# Patient Record
Sex: FEMALE | Race: WHITE
[De-identification: names, ages, dates, MRNs, and addresses within clinical notes are randomized per-mention and may not be internally consistent; named-entity substitution may affect disease eponyms.]

---

## 2017-01-27 PROBLEM — Z00.00 ENCOUNTER FOR PREVENTIVE HEALTH EXAMINATION: Status: ACTIVE | Noted: 2017-01-27

## 2017-02-07 ENCOUNTER — APPOINTMENT (OUTPATIENT)
Dept: OTOLARYNGOLOGY | Facility: CLINIC | Age: 72
End: 2017-02-07

## 2017-02-07 VITALS
DIASTOLIC BLOOD PRESSURE: 90 MMHG | RESPIRATION RATE: 16 BRPM | HEIGHT: 64 IN | BODY MASS INDEX: 30.39 KG/M2 | HEART RATE: 68 BPM | WEIGHT: 178 LBS | SYSTOLIC BLOOD PRESSURE: 158 MMHG

## 2017-02-07 DIAGNOSIS — Z82.49 FAMILY HISTORY OF ISCHEMIC HEART DISEASE AND OTHER DISEASES OF THE CIRCULATORY SYSTEM: ICD-10-CM

## 2017-02-07 DIAGNOSIS — Z78.9 OTHER SPECIFIED HEALTH STATUS: ICD-10-CM

## 2017-02-07 DIAGNOSIS — K11.20 SIALOADENITIS, UNSPECIFIED: ICD-10-CM

## 2017-02-07 DIAGNOSIS — C73 MALIGNANT NEOPLASM OF THYROID GLAND: ICD-10-CM

## 2017-02-07 DIAGNOSIS — Z87.891 PERSONAL HISTORY OF NICOTINE DEPENDENCE: ICD-10-CM

## 2017-02-27 ENCOUNTER — MESSAGE (OUTPATIENT)
Age: 72
End: 2017-02-27

## 2017-03-13 ENCOUNTER — RESULT REVIEW (OUTPATIENT)
Age: 72
End: 2017-03-13

## 2017-12-18 ENCOUNTER — APPOINTMENT (OUTPATIENT)
Dept: CARDIOLOGY | Facility: CLINIC | Age: 72
End: 2017-12-18
Payer: MEDICARE

## 2017-12-18 PROCEDURE — ZZZZZ: CPT

## 2017-12-20 ENCOUNTER — APPOINTMENT (OUTPATIENT)
Dept: CARDIOLOGY | Facility: CLINIC | Age: 72
End: 2017-12-20
Payer: MEDICARE

## 2017-12-20 PROCEDURE — PHASE4: CUSTOM

## 2017-12-22 ENCOUNTER — APPOINTMENT (OUTPATIENT)
Dept: CARDIOLOGY | Facility: CLINIC | Age: 72
End: 2017-12-22
Payer: MEDICARE

## 2017-12-22 PROCEDURE — ZZZZZ: CPT

## 2017-12-22 PROCEDURE — PHASE4: CUSTOM

## 2017-12-27 ENCOUNTER — APPOINTMENT (OUTPATIENT)
Dept: CARDIOLOGY | Facility: CLINIC | Age: 72
End: 2017-12-27
Payer: MEDICARE

## 2017-12-27 PROCEDURE — PHASE4: CUSTOM

## 2017-12-27 PROCEDURE — ZZZZZ: CPT

## 2017-12-29 ENCOUNTER — APPOINTMENT (OUTPATIENT)
Dept: CARDIOLOGY | Facility: CLINIC | Age: 72
End: 2017-12-29
Payer: MEDICARE

## 2017-12-29 PROCEDURE — ZZZZZ: CPT

## 2017-12-29 PROCEDURE — PHASE4: CUSTOM

## 2018-01-03 ENCOUNTER — APPOINTMENT (OUTPATIENT)
Dept: CARDIOLOGY | Facility: CLINIC | Age: 73
End: 2018-01-03
Payer: MEDICARE

## 2018-01-03 PROCEDURE — PHASE4: CUSTOM

## 2018-01-05 ENCOUNTER — APPOINTMENT (OUTPATIENT)
Dept: CARDIOLOGY | Facility: CLINIC | Age: 73
End: 2018-01-05
Payer: MEDICARE

## 2018-01-05 PROCEDURE — ZZZZZ: CPT

## 2018-01-08 ENCOUNTER — APPOINTMENT (OUTPATIENT)
Dept: CARDIOLOGY | Facility: CLINIC | Age: 73
End: 2018-01-08
Payer: MEDICARE

## 2018-01-08 PROCEDURE — PHASE4: CUSTOM

## 2018-01-08 PROCEDURE — ZZZZZ: CPT

## 2018-01-10 ENCOUNTER — APPOINTMENT (OUTPATIENT)
Dept: CARDIOLOGY | Facility: CLINIC | Age: 73
End: 2018-01-10
Payer: MEDICARE

## 2018-01-10 PROCEDURE — PHASE4: CUSTOM

## 2018-01-10 PROCEDURE — ZZZZZ: CPT

## 2018-01-12 ENCOUNTER — APPOINTMENT (OUTPATIENT)
Dept: CARDIOLOGY | Facility: CLINIC | Age: 73
End: 2018-01-12
Payer: MEDICARE

## 2018-01-12 PROCEDURE — ZZZZZ: CPT

## 2018-01-12 PROCEDURE — PHASE4: CUSTOM

## 2018-01-17 ENCOUNTER — APPOINTMENT (OUTPATIENT)
Dept: CARDIOLOGY | Facility: CLINIC | Age: 73
End: 2018-01-17
Payer: MEDICARE

## 2018-01-17 PROCEDURE — ZZZZZ: CPT

## 2018-01-19 ENCOUNTER — APPOINTMENT (OUTPATIENT)
Dept: CARDIOLOGY | Facility: CLINIC | Age: 73
End: 2018-01-19
Payer: MEDICARE

## 2018-01-19 PROCEDURE — ZZZZZ: CPT

## 2018-01-22 ENCOUNTER — APPOINTMENT (OUTPATIENT)
Dept: CARDIOLOGY | Facility: CLINIC | Age: 73
End: 2018-01-22
Payer: SELF-PAY

## 2018-01-22 PROCEDURE — ZZZZZ: CPT

## 2018-01-24 ENCOUNTER — APPOINTMENT (OUTPATIENT)
Dept: CARDIOLOGY | Facility: CLINIC | Age: 73
End: 2018-01-24
Payer: SELF-PAY

## 2018-01-24 PROCEDURE — ZZZZZ: CPT

## 2018-01-26 ENCOUNTER — APPOINTMENT (OUTPATIENT)
Dept: CARDIOLOGY | Facility: CLINIC | Age: 73
End: 2018-01-26
Payer: SELF-PAY

## 2018-01-26 PROCEDURE — ZZZZZ: CPT

## 2018-01-29 ENCOUNTER — APPOINTMENT (OUTPATIENT)
Dept: CARDIOLOGY | Facility: CLINIC | Age: 73
End: 2018-01-29
Payer: SELF-PAY

## 2018-01-29 PROCEDURE — PHASE4: CUSTOM

## 2018-01-29 PROCEDURE — ZZZZZ: CPT

## 2018-01-31 ENCOUNTER — APPOINTMENT (OUTPATIENT)
Dept: CARDIOLOGY | Facility: CLINIC | Age: 73
End: 2018-01-31

## 2018-02-02 ENCOUNTER — APPOINTMENT (OUTPATIENT)
Dept: CARDIOLOGY | Facility: CLINIC | Age: 73
End: 2018-02-02

## 2018-02-05 ENCOUNTER — APPOINTMENT (OUTPATIENT)
Dept: CARDIOLOGY | Facility: CLINIC | Age: 73
End: 2018-02-05

## 2018-02-07 ENCOUNTER — APPOINTMENT (OUTPATIENT)
Dept: CARDIOLOGY | Facility: CLINIC | Age: 73
End: 2018-02-07

## 2018-02-09 ENCOUNTER — APPOINTMENT (OUTPATIENT)
Dept: CARDIOLOGY | Facility: CLINIC | Age: 73
End: 2018-02-09

## 2018-02-12 ENCOUNTER — APPOINTMENT (OUTPATIENT)
Dept: CARDIOLOGY | Facility: CLINIC | Age: 73
End: 2018-02-12
Payer: SELF-PAY

## 2018-02-12 PROCEDURE — PHASE4: CUSTOM

## 2018-02-14 ENCOUNTER — APPOINTMENT (OUTPATIENT)
Dept: CARDIOLOGY | Facility: CLINIC | Age: 73
End: 2018-02-14
Payer: SELF-PAY

## 2018-02-14 PROCEDURE — PHASE4: CUSTOM

## 2018-02-16 ENCOUNTER — APPOINTMENT (OUTPATIENT)
Dept: CARDIOLOGY | Facility: CLINIC | Age: 73
End: 2018-02-16
Payer: SELF-PAY

## 2018-02-16 PROCEDURE — PHASE4: CUSTOM

## 2018-02-19 ENCOUNTER — APPOINTMENT (OUTPATIENT)
Dept: CARDIOLOGY | Facility: CLINIC | Age: 73
End: 2018-02-19
Payer: SELF-PAY

## 2018-02-19 PROCEDURE — PHASE4: CUSTOM

## 2018-02-21 ENCOUNTER — APPOINTMENT (OUTPATIENT)
Dept: CARDIOLOGY | Facility: CLINIC | Age: 73
End: 2018-02-21
Payer: SELF-PAY

## 2018-02-21 PROCEDURE — PHASE4: CUSTOM

## 2018-02-23 ENCOUNTER — APPOINTMENT (OUTPATIENT)
Dept: CARDIOLOGY | Facility: CLINIC | Age: 73
End: 2018-02-23
Payer: SELF-PAY

## 2018-02-23 PROCEDURE — PHASE4: CUSTOM

## 2018-02-26 ENCOUNTER — APPOINTMENT (OUTPATIENT)
Dept: CARDIOLOGY | Facility: CLINIC | Age: 73
End: 2018-02-26
Payer: SELF-PAY

## 2018-02-26 PROCEDURE — PHASE4: CUSTOM

## 2018-02-28 ENCOUNTER — APPOINTMENT (OUTPATIENT)
Dept: CARDIOLOGY | Facility: CLINIC | Age: 73
End: 2018-02-28
Payer: SELF-PAY

## 2018-02-28 PROCEDURE — PHASE4: CUSTOM

## 2018-03-02 ENCOUNTER — APPOINTMENT (OUTPATIENT)
Dept: CARDIOLOGY | Facility: CLINIC | Age: 73
End: 2018-03-02

## 2018-03-05 ENCOUNTER — APPOINTMENT (OUTPATIENT)
Dept: CARDIOLOGY | Facility: CLINIC | Age: 73
End: 2018-03-05
Payer: SELF-PAY

## 2018-03-05 PROCEDURE — PHASE4: CUSTOM

## 2018-03-07 ENCOUNTER — APPOINTMENT (OUTPATIENT)
Dept: CARDIOLOGY | Facility: CLINIC | Age: 73
End: 2018-03-07
Payer: SELF-PAY

## 2018-03-07 PROCEDURE — PHASE4: CUSTOM

## 2018-03-09 ENCOUNTER — APPOINTMENT (OUTPATIENT)
Dept: CARDIOLOGY | Facility: CLINIC | Age: 73
End: 2018-03-09

## 2018-03-12 ENCOUNTER — APPOINTMENT (OUTPATIENT)
Dept: CARDIOLOGY | Facility: CLINIC | Age: 73
End: 2018-03-12
Payer: SELF-PAY

## 2018-03-12 PROCEDURE — PHASE4: CUSTOM

## 2018-03-14 ENCOUNTER — APPOINTMENT (OUTPATIENT)
Dept: CARDIOLOGY | Facility: CLINIC | Age: 73
End: 2018-03-14
Payer: SELF-PAY

## 2018-03-14 PROCEDURE — PHASE4: CUSTOM

## 2018-03-16 ENCOUNTER — APPOINTMENT (OUTPATIENT)
Dept: CARDIOLOGY | Facility: CLINIC | Age: 73
End: 2018-03-16
Payer: SELF-PAY

## 2018-03-16 PROCEDURE — PHASE4: CUSTOM

## 2018-03-19 ENCOUNTER — APPOINTMENT (OUTPATIENT)
Dept: CARDIOLOGY | Facility: CLINIC | Age: 73
End: 2018-03-19
Payer: SELF-PAY

## 2018-03-19 PROCEDURE — PHASE4: CUSTOM

## 2018-03-21 ENCOUNTER — APPOINTMENT (OUTPATIENT)
Dept: CARDIOLOGY | Facility: CLINIC | Age: 73
End: 2018-03-21

## 2018-03-23 ENCOUNTER — APPOINTMENT (OUTPATIENT)
Dept: CARDIOLOGY | Facility: CLINIC | Age: 73
End: 2018-03-23
Payer: SELF-PAY

## 2018-03-23 PROCEDURE — PHASE4: CUSTOM

## 2018-03-26 ENCOUNTER — APPOINTMENT (OUTPATIENT)
Dept: CARDIOLOGY | Facility: CLINIC | Age: 73
End: 2018-03-26
Payer: SELF-PAY

## 2018-03-26 PROCEDURE — PHASE4: CUSTOM

## 2018-03-28 ENCOUNTER — APPOINTMENT (OUTPATIENT)
Dept: CARDIOLOGY | Facility: CLINIC | Age: 73
End: 2018-03-28
Payer: SELF-PAY

## 2018-03-28 PROCEDURE — PHASE4: CUSTOM

## 2018-03-30 ENCOUNTER — APPOINTMENT (OUTPATIENT)
Dept: CARDIOLOGY | Facility: CLINIC | Age: 73
End: 2018-03-30

## 2018-04-02 ENCOUNTER — APPOINTMENT (OUTPATIENT)
Dept: CARDIOLOGY | Facility: CLINIC | Age: 73
End: 2018-04-02

## 2018-04-04 ENCOUNTER — APPOINTMENT (OUTPATIENT)
Dept: CARDIOLOGY | Facility: CLINIC | Age: 73
End: 2018-04-04
Payer: SELF-PAY

## 2018-04-04 PROCEDURE — PHASE4: CUSTOM

## 2018-04-06 ENCOUNTER — APPOINTMENT (OUTPATIENT)
Dept: CARDIOLOGY | Facility: CLINIC | Age: 73
End: 2018-04-06
Payer: SELF-PAY

## 2018-04-06 PROCEDURE — PHASE4: CUSTOM

## 2018-04-09 ENCOUNTER — APPOINTMENT (OUTPATIENT)
Dept: CARDIOLOGY | Facility: CLINIC | Age: 73
End: 2018-04-09
Payer: SELF-PAY

## 2018-04-09 PROCEDURE — PHASE4: CUSTOM

## 2018-04-11 ENCOUNTER — APPOINTMENT (OUTPATIENT)
Dept: CARDIOLOGY | Facility: CLINIC | Age: 73
End: 2018-04-11
Payer: SELF-PAY

## 2018-04-11 PROCEDURE — PHASE4: CUSTOM

## 2018-04-13 ENCOUNTER — APPOINTMENT (OUTPATIENT)
Dept: CARDIOLOGY | Facility: CLINIC | Age: 73
End: 2018-04-13
Payer: SELF-PAY

## 2018-04-13 PROCEDURE — PHASE4: CUSTOM

## 2018-04-16 ENCOUNTER — APPOINTMENT (OUTPATIENT)
Dept: CARDIOLOGY | Facility: CLINIC | Age: 73
End: 2018-04-16
Payer: SELF-PAY

## 2018-04-16 PROCEDURE — PHASE4: CUSTOM

## 2018-04-18 ENCOUNTER — APPOINTMENT (OUTPATIENT)
Dept: CARDIOLOGY | Facility: CLINIC | Age: 73
End: 2018-04-18
Payer: SELF-PAY

## 2018-04-18 PROCEDURE — PHASE4: CUSTOM

## 2018-04-20 ENCOUNTER — APPOINTMENT (OUTPATIENT)
Dept: CARDIOLOGY | Facility: CLINIC | Age: 73
End: 2018-04-20
Payer: SELF-PAY

## 2018-04-20 PROCEDURE — PHASE4: CUSTOM

## 2018-04-23 ENCOUNTER — APPOINTMENT (OUTPATIENT)
Dept: CARDIOLOGY | Facility: CLINIC | Age: 73
End: 2018-04-23
Payer: SELF-PAY

## 2018-04-23 PROCEDURE — PHASE4: CUSTOM

## 2018-04-25 ENCOUNTER — APPOINTMENT (OUTPATIENT)
Dept: CARDIOLOGY | Facility: CLINIC | Age: 73
End: 2018-04-25

## 2018-04-27 ENCOUNTER — APPOINTMENT (OUTPATIENT)
Dept: CARDIOLOGY | Facility: CLINIC | Age: 73
End: 2018-04-27

## 2018-04-30 ENCOUNTER — APPOINTMENT (OUTPATIENT)
Dept: CARDIOLOGY | Facility: CLINIC | Age: 73
End: 2018-04-30
Payer: SELF-PAY

## 2018-04-30 PROCEDURE — PHASE4: CUSTOM

## 2018-05-02 ENCOUNTER — APPOINTMENT (OUTPATIENT)
Dept: CARDIOLOGY | Facility: CLINIC | Age: 73
End: 2018-05-02
Payer: SELF-PAY

## 2018-05-02 PROCEDURE — PHASE4: CUSTOM

## 2018-05-04 ENCOUNTER — APPOINTMENT (OUTPATIENT)
Dept: CARDIOLOGY | Facility: CLINIC | Age: 73
End: 2018-05-04
Payer: SELF-PAY

## 2018-05-04 PROCEDURE — PHASE4: CUSTOM

## 2018-05-07 ENCOUNTER — APPOINTMENT (OUTPATIENT)
Dept: CARDIOLOGY | Facility: CLINIC | Age: 73
End: 2018-05-07
Payer: SELF-PAY

## 2018-05-07 PROCEDURE — PHASE4: CUSTOM

## 2018-05-09 ENCOUNTER — APPOINTMENT (OUTPATIENT)
Dept: CARDIOLOGY | Facility: CLINIC | Age: 73
End: 2018-05-09

## 2018-05-11 ENCOUNTER — APPOINTMENT (OUTPATIENT)
Dept: CARDIOLOGY | Facility: CLINIC | Age: 73
End: 2018-05-11
Payer: SELF-PAY

## 2018-05-11 PROCEDURE — PHASE4: CUSTOM

## 2018-05-14 ENCOUNTER — APPOINTMENT (OUTPATIENT)
Dept: CARDIOLOGY | Facility: CLINIC | Age: 73
End: 2018-05-14
Payer: SELF-PAY

## 2018-05-14 PROCEDURE — PHASE4: CUSTOM

## 2018-05-16 ENCOUNTER — APPOINTMENT (OUTPATIENT)
Dept: CARDIOLOGY | Facility: CLINIC | Age: 73
End: 2018-05-16
Payer: SELF-PAY

## 2018-05-16 PROCEDURE — PHASE4: CUSTOM

## 2018-05-18 ENCOUNTER — APPOINTMENT (OUTPATIENT)
Dept: CARDIOLOGY | Facility: CLINIC | Age: 73
End: 2018-05-18

## 2018-05-21 ENCOUNTER — APPOINTMENT (OUTPATIENT)
Dept: CARDIOLOGY | Facility: CLINIC | Age: 73
End: 2018-05-21

## 2018-05-23 ENCOUNTER — APPOINTMENT (OUTPATIENT)
Dept: CARDIOLOGY | Facility: CLINIC | Age: 73
End: 2018-05-23
Payer: SELF-PAY

## 2018-05-23 PROCEDURE — PHASE4: CUSTOM

## 2018-05-25 ENCOUNTER — APPOINTMENT (OUTPATIENT)
Dept: CARDIOLOGY | Facility: CLINIC | Age: 73
End: 2018-05-25
Payer: SELF-PAY

## 2018-05-25 PROCEDURE — PHASE4: CUSTOM

## 2018-05-30 ENCOUNTER — APPOINTMENT (OUTPATIENT)
Dept: CARDIOLOGY | Facility: CLINIC | Age: 73
End: 2018-05-30
Payer: SELF-PAY

## 2018-05-30 PROCEDURE — PHASE4: CUSTOM

## 2018-06-01 ENCOUNTER — APPOINTMENT (OUTPATIENT)
Dept: CARDIOLOGY | Facility: CLINIC | Age: 73
End: 2018-06-01
Payer: SELF-PAY

## 2018-06-01 PROCEDURE — PHASE4: CUSTOM

## 2018-06-04 ENCOUNTER — APPOINTMENT (OUTPATIENT)
Dept: CARDIOLOGY | Facility: CLINIC | Age: 73
End: 2018-06-04
Payer: SELF-PAY

## 2018-06-04 PROCEDURE — PHASE4: CUSTOM

## 2018-06-06 ENCOUNTER — APPOINTMENT (OUTPATIENT)
Dept: CARDIOLOGY | Facility: CLINIC | Age: 73
End: 2018-06-06
Payer: SELF-PAY

## 2018-06-06 PROCEDURE — PHASE4: CUSTOM

## 2018-06-08 ENCOUNTER — APPOINTMENT (OUTPATIENT)
Dept: CARDIOLOGY | Facility: CLINIC | Age: 73
End: 2018-06-08
Payer: SELF-PAY

## 2018-06-08 PROCEDURE — PHASE4: CUSTOM

## 2018-06-11 ENCOUNTER — APPOINTMENT (OUTPATIENT)
Dept: CARDIOLOGY | Facility: CLINIC | Age: 73
End: 2018-06-11

## 2018-06-13 ENCOUNTER — APPOINTMENT (OUTPATIENT)
Dept: CARDIOLOGY | Facility: CLINIC | Age: 73
End: 2018-06-13
Payer: SELF-PAY

## 2018-06-13 PROCEDURE — PHASE4: CUSTOM

## 2018-06-15 ENCOUNTER — APPOINTMENT (OUTPATIENT)
Dept: CARDIOLOGY | Facility: CLINIC | Age: 73
End: 2018-06-15
Payer: SELF-PAY

## 2018-06-15 PROCEDURE — PHASE4: CUSTOM

## 2018-06-18 ENCOUNTER — APPOINTMENT (OUTPATIENT)
Dept: CARDIOLOGY | Facility: CLINIC | Age: 73
End: 2018-06-18
Payer: SELF-PAY

## 2018-06-18 PROCEDURE — PHASE4: CUSTOM

## 2018-06-20 ENCOUNTER — APPOINTMENT (OUTPATIENT)
Dept: CARDIOLOGY | Facility: CLINIC | Age: 73
End: 2018-06-20
Payer: SELF-PAY

## 2018-06-20 PROCEDURE — PHASE4: CUSTOM

## 2018-06-22 ENCOUNTER — APPOINTMENT (OUTPATIENT)
Dept: CARDIOLOGY | Facility: CLINIC | Age: 73
End: 2018-06-22
Payer: SELF-PAY

## 2018-06-22 PROCEDURE — PHASE4: CUSTOM

## 2018-06-25 ENCOUNTER — APPOINTMENT (OUTPATIENT)
Dept: CARDIOLOGY | Facility: CLINIC | Age: 73
End: 2018-06-25
Payer: SELF-PAY

## 2018-06-25 PROCEDURE — PHASE4: CUSTOM

## 2018-06-27 ENCOUNTER — APPOINTMENT (OUTPATIENT)
Dept: CARDIOLOGY | Facility: CLINIC | Age: 73
End: 2018-06-27
Payer: SELF-PAY

## 2018-06-27 PROCEDURE — PHASE4: CUSTOM

## 2018-06-29 ENCOUNTER — APPOINTMENT (OUTPATIENT)
Dept: CARDIOLOGY | Facility: CLINIC | Age: 73
End: 2018-06-29
Payer: SELF-PAY

## 2018-06-29 PROCEDURE — PHASE4: CUSTOM

## 2018-07-02 ENCOUNTER — APPOINTMENT (OUTPATIENT)
Dept: CARDIOLOGY | Facility: CLINIC | Age: 73
End: 2018-07-02
Payer: SELF-PAY

## 2018-07-02 PROCEDURE — PHASE4: CUSTOM

## 2018-07-06 ENCOUNTER — APPOINTMENT (OUTPATIENT)
Dept: CARDIOLOGY | Facility: CLINIC | Age: 73
End: 2018-07-06

## 2018-07-09 ENCOUNTER — APPOINTMENT (OUTPATIENT)
Dept: CARDIOLOGY | Facility: CLINIC | Age: 73
End: 2018-07-09
Payer: SELF-PAY

## 2018-07-09 PROCEDURE — PHASE4: CUSTOM

## 2018-07-11 ENCOUNTER — APPOINTMENT (OUTPATIENT)
Dept: CARDIOLOGY | Facility: CLINIC | Age: 73
End: 2018-07-11
Payer: SELF-PAY

## 2018-07-11 PROCEDURE — PHASE4: CUSTOM

## 2018-07-13 ENCOUNTER — APPOINTMENT (OUTPATIENT)
Dept: CARDIOLOGY | Facility: CLINIC | Age: 73
End: 2018-07-13
Payer: SELF-PAY

## 2018-07-13 PROCEDURE — PHASE4: CUSTOM

## 2018-07-16 ENCOUNTER — APPOINTMENT (OUTPATIENT)
Dept: CARDIOLOGY | Facility: CLINIC | Age: 73
End: 2018-07-16
Payer: SELF-PAY

## 2018-07-16 PROCEDURE — PHASE4: CUSTOM

## 2018-07-18 ENCOUNTER — APPOINTMENT (OUTPATIENT)
Dept: CARDIOLOGY | Facility: CLINIC | Age: 73
End: 2018-07-18
Payer: SELF-PAY

## 2018-07-18 PROCEDURE — PHASE4: CUSTOM

## 2018-07-20 ENCOUNTER — APPOINTMENT (OUTPATIENT)
Dept: CARDIOLOGY | Facility: CLINIC | Age: 73
End: 2018-07-20
Payer: SELF-PAY

## 2018-07-20 PROCEDURE — PHASE4: CUSTOM

## 2018-07-23 ENCOUNTER — APPOINTMENT (OUTPATIENT)
Dept: CARDIOLOGY | Facility: CLINIC | Age: 73
End: 2018-07-23

## 2018-07-25 ENCOUNTER — APPOINTMENT (OUTPATIENT)
Dept: CARDIOLOGY | Facility: CLINIC | Age: 73
End: 2018-07-25
Payer: SELF-PAY

## 2018-07-25 PROCEDURE — PHASE4: CUSTOM

## 2018-07-27 ENCOUNTER — APPOINTMENT (OUTPATIENT)
Dept: CARDIOLOGY | Facility: CLINIC | Age: 73
End: 2018-07-27
Payer: SELF-PAY

## 2018-07-27 PROCEDURE — PHASE4: CUSTOM

## 2018-07-30 ENCOUNTER — APPOINTMENT (OUTPATIENT)
Dept: CARDIOLOGY | Facility: CLINIC | Age: 73
End: 2018-07-30

## 2018-08-01 ENCOUNTER — APPOINTMENT (OUTPATIENT)
Dept: CARDIOLOGY | Facility: CLINIC | Age: 73
End: 2018-08-01

## 2018-08-03 ENCOUNTER — APPOINTMENT (OUTPATIENT)
Dept: CARDIOLOGY | Facility: CLINIC | Age: 73
End: 2018-08-03

## 2018-08-06 ENCOUNTER — APPOINTMENT (OUTPATIENT)
Dept: CARDIOLOGY | Facility: CLINIC | Age: 73
End: 2018-08-06

## 2018-08-08 ENCOUNTER — APPOINTMENT (OUTPATIENT)
Dept: CARDIOLOGY | Facility: CLINIC | Age: 73
End: 2018-08-08
Payer: SELF-PAY

## 2018-08-08 PROCEDURE — PHASE4: CUSTOM

## 2018-08-10 ENCOUNTER — APPOINTMENT (OUTPATIENT)
Dept: CARDIOLOGY | Facility: CLINIC | Age: 73
End: 2018-08-10
Payer: SELF-PAY

## 2018-08-10 PROCEDURE — PHASE4: CUSTOM

## 2018-08-13 ENCOUNTER — APPOINTMENT (OUTPATIENT)
Dept: CARDIOLOGY | Facility: CLINIC | Age: 73
End: 2018-08-13

## 2018-08-15 ENCOUNTER — APPOINTMENT (OUTPATIENT)
Dept: CARDIOLOGY | Facility: CLINIC | Age: 73
End: 2018-08-15
Payer: SELF-PAY

## 2018-08-15 PROCEDURE — PHASE4: CUSTOM

## 2018-08-17 ENCOUNTER — APPOINTMENT (OUTPATIENT)
Dept: CARDIOLOGY | Facility: CLINIC | Age: 73
End: 2018-08-17

## 2018-08-20 ENCOUNTER — APPOINTMENT (OUTPATIENT)
Dept: CARDIOLOGY | Facility: CLINIC | Age: 73
End: 2018-08-20

## 2018-08-22 ENCOUNTER — APPOINTMENT (OUTPATIENT)
Dept: CARDIOLOGY | Facility: CLINIC | Age: 73
End: 2018-08-22
Payer: SELF-PAY

## 2018-08-22 PROCEDURE — PHASE4: CUSTOM

## 2018-08-24 ENCOUNTER — APPOINTMENT (OUTPATIENT)
Dept: CARDIOLOGY | Facility: CLINIC | Age: 73
End: 2018-08-24
Payer: SELF-PAY

## 2018-08-24 PROCEDURE — PHASE4: CUSTOM

## 2018-08-27 ENCOUNTER — APPOINTMENT (OUTPATIENT)
Dept: CARDIOLOGY | Facility: CLINIC | Age: 73
End: 2018-08-27

## 2018-08-29 ENCOUNTER — APPOINTMENT (OUTPATIENT)
Dept: CARDIOLOGY | Facility: CLINIC | Age: 73
End: 2018-08-29
Payer: SELF-PAY

## 2018-08-29 PROCEDURE — PHASE4: CUSTOM

## 2018-08-31 ENCOUNTER — APPOINTMENT (OUTPATIENT)
Dept: CARDIOLOGY | Facility: CLINIC | Age: 73
End: 2018-08-31

## 2018-09-05 ENCOUNTER — APPOINTMENT (OUTPATIENT)
Dept: CARDIOLOGY | Facility: CLINIC | Age: 73
End: 2018-09-05

## 2018-09-07 ENCOUNTER — APPOINTMENT (OUTPATIENT)
Dept: CARDIOLOGY | Facility: CLINIC | Age: 73
End: 2018-09-07
Payer: SELF-PAY

## 2018-09-07 PROCEDURE — PHASE4: CUSTOM

## 2018-09-10 ENCOUNTER — APPOINTMENT (OUTPATIENT)
Dept: CARDIOLOGY | Facility: CLINIC | Age: 73
End: 2018-09-10

## 2018-09-12 ENCOUNTER — APPOINTMENT (OUTPATIENT)
Dept: CARDIOLOGY | Facility: CLINIC | Age: 73
End: 2018-09-12
Payer: SELF-PAY

## 2018-09-12 PROCEDURE — PHASE4: CUSTOM

## 2018-09-14 ENCOUNTER — APPOINTMENT (OUTPATIENT)
Dept: CARDIOLOGY | Facility: CLINIC | Age: 73
End: 2018-09-14
Payer: SELF-PAY

## 2018-09-14 PROCEDURE — PHASE4: CUSTOM

## 2018-09-17 ENCOUNTER — APPOINTMENT (OUTPATIENT)
Dept: CARDIOLOGY | Facility: CLINIC | Age: 73
End: 2018-09-17
Payer: SELF-PAY

## 2018-09-17 PROCEDURE — PHASE4: CUSTOM

## 2018-09-19 ENCOUNTER — APPOINTMENT (OUTPATIENT)
Dept: CARDIOLOGY | Facility: CLINIC | Age: 73
End: 2018-09-19

## 2018-09-21 ENCOUNTER — APPOINTMENT (OUTPATIENT)
Dept: CARDIOLOGY | Facility: CLINIC | Age: 73
End: 2018-09-21
Payer: SELF-PAY

## 2018-09-21 PROCEDURE — PHASE4: CUSTOM

## 2018-09-24 ENCOUNTER — APPOINTMENT (OUTPATIENT)
Dept: CARDIOLOGY | Facility: CLINIC | Age: 73
End: 2018-09-24

## 2018-09-26 ENCOUNTER — APPOINTMENT (OUTPATIENT)
Dept: CARDIOLOGY | Facility: CLINIC | Age: 73
End: 2018-09-26
Payer: SELF-PAY

## 2018-09-26 PROCEDURE — PHASE4: CUSTOM

## 2018-09-28 ENCOUNTER — APPOINTMENT (OUTPATIENT)
Dept: CARDIOLOGY | Facility: CLINIC | Age: 73
End: 2018-09-28

## 2018-10-01 ENCOUNTER — APPOINTMENT (OUTPATIENT)
Dept: CARDIOLOGY | Facility: CLINIC | Age: 73
End: 2018-10-01
Payer: SELF-PAY

## 2018-10-01 PROCEDURE — PHASE4: CUSTOM

## 2018-10-03 ENCOUNTER — APPOINTMENT (OUTPATIENT)
Dept: CARDIOLOGY | Facility: CLINIC | Age: 73
End: 2018-10-03
Payer: SELF-PAY

## 2018-10-03 PROCEDURE — PHASE4: CUSTOM

## 2018-10-05 ENCOUNTER — APPOINTMENT (OUTPATIENT)
Dept: CARDIOLOGY | Facility: CLINIC | Age: 73
End: 2018-10-05
Payer: SELF-PAY

## 2018-10-05 PROCEDURE — PHASE4: CUSTOM

## 2018-10-08 ENCOUNTER — APPOINTMENT (OUTPATIENT)
Dept: CARDIOLOGY | Facility: CLINIC | Age: 73
End: 2018-10-08

## 2018-10-10 ENCOUNTER — APPOINTMENT (OUTPATIENT)
Dept: CARDIOLOGY | Facility: CLINIC | Age: 73
End: 2018-10-10
Payer: SELF-PAY

## 2018-10-10 PROCEDURE — PHASE4: CUSTOM

## 2018-10-12 ENCOUNTER — APPOINTMENT (OUTPATIENT)
Dept: CARDIOLOGY | Facility: CLINIC | Age: 73
End: 2018-10-12
Payer: SELF-PAY

## 2018-10-12 PROCEDURE — PHASE4: CUSTOM

## 2018-10-15 ENCOUNTER — APPOINTMENT (OUTPATIENT)
Dept: CARDIOLOGY | Facility: CLINIC | Age: 73
End: 2018-10-15

## 2018-10-17 ENCOUNTER — APPOINTMENT (OUTPATIENT)
Dept: CARDIOLOGY | Facility: CLINIC | Age: 73
End: 2018-10-17
Payer: SELF-PAY

## 2018-10-17 PROCEDURE — PHASE4: CUSTOM

## 2018-10-19 ENCOUNTER — APPOINTMENT (OUTPATIENT)
Dept: CARDIOLOGY | Facility: CLINIC | Age: 73
End: 2018-10-19
Payer: SELF-PAY

## 2018-10-19 PROCEDURE — PHASE4: CUSTOM

## 2018-10-22 ENCOUNTER — APPOINTMENT (OUTPATIENT)
Dept: CARDIOLOGY | Facility: CLINIC | Age: 73
End: 2018-10-22
Payer: SELF-PAY

## 2018-10-22 PROCEDURE — PHASE4: CUSTOM

## 2018-10-24 ENCOUNTER — APPOINTMENT (OUTPATIENT)
Dept: CARDIOLOGY | Facility: CLINIC | Age: 73
End: 2018-10-24

## 2018-10-26 ENCOUNTER — APPOINTMENT (OUTPATIENT)
Dept: CARDIOLOGY | Facility: CLINIC | Age: 73
End: 2018-10-26
Payer: SELF-PAY

## 2018-10-26 PROCEDURE — PHASE4: CUSTOM

## 2018-10-29 ENCOUNTER — APPOINTMENT (OUTPATIENT)
Dept: CARDIOLOGY | Facility: CLINIC | Age: 73
End: 2018-10-29

## 2018-10-31 ENCOUNTER — APPOINTMENT (OUTPATIENT)
Dept: CARDIOLOGY | Facility: CLINIC | Age: 73
End: 2018-10-31
Payer: SELF-PAY

## 2018-10-31 PROCEDURE — PHASE4: CUSTOM

## 2018-11-02 ENCOUNTER — APPOINTMENT (OUTPATIENT)
Dept: CARDIOLOGY | Facility: CLINIC | Age: 73
End: 2018-11-02

## 2018-11-05 ENCOUNTER — APPOINTMENT (OUTPATIENT)
Dept: CARDIOLOGY | Facility: CLINIC | Age: 73
End: 2018-11-05
Payer: SELF-PAY

## 2018-11-05 PROCEDURE — PHASE4: CUSTOM

## 2018-11-07 ENCOUNTER — APPOINTMENT (OUTPATIENT)
Dept: CARDIOLOGY | Facility: CLINIC | Age: 73
End: 2018-11-07
Payer: SELF-PAY

## 2018-11-07 PROCEDURE — PHASE4: CUSTOM

## 2018-11-09 ENCOUNTER — APPOINTMENT (OUTPATIENT)
Dept: CARDIOLOGY | Facility: CLINIC | Age: 73
End: 2018-11-09
Payer: SELF-PAY

## 2018-11-09 PROCEDURE — PHASE4: CUSTOM

## 2018-11-12 ENCOUNTER — APPOINTMENT (OUTPATIENT)
Dept: CARDIOLOGY | Facility: CLINIC | Age: 73
End: 2018-11-12
Payer: SELF-PAY

## 2018-11-12 PROCEDURE — PHASE4: CUSTOM

## 2018-11-14 ENCOUNTER — APPOINTMENT (OUTPATIENT)
Dept: CARDIOLOGY | Facility: CLINIC | Age: 73
End: 2018-11-14
Payer: SELF-PAY

## 2018-11-14 PROCEDURE — PHASE4: CUSTOM

## 2018-11-16 ENCOUNTER — APPOINTMENT (OUTPATIENT)
Dept: CARDIOLOGY | Facility: CLINIC | Age: 73
End: 2018-11-16
Payer: SELF-PAY

## 2018-11-16 PROCEDURE — PHASE4: CUSTOM

## 2018-11-19 ENCOUNTER — APPOINTMENT (OUTPATIENT)
Dept: CARDIOLOGY | Facility: CLINIC | Age: 73
End: 2018-11-19
Payer: SELF-PAY

## 2018-11-19 PROCEDURE — PHASE4: CUSTOM

## 2018-11-21 ENCOUNTER — APPOINTMENT (OUTPATIENT)
Dept: CARDIOLOGY | Facility: CLINIC | Age: 73
End: 2018-11-21
Payer: SELF-PAY

## 2018-11-21 PROCEDURE — PHASE4: CUSTOM

## 2018-11-26 ENCOUNTER — APPOINTMENT (OUTPATIENT)
Dept: CARDIOLOGY | Facility: CLINIC | Age: 73
End: 2018-11-26
Payer: SELF-PAY

## 2018-11-26 PROCEDURE — PHASE4: CUSTOM

## 2018-11-28 ENCOUNTER — APPOINTMENT (OUTPATIENT)
Dept: CARDIOLOGY | Facility: CLINIC | Age: 73
End: 2018-11-28
Payer: SELF-PAY

## 2018-11-28 PROCEDURE — PHASE4: CUSTOM

## 2018-11-30 ENCOUNTER — APPOINTMENT (OUTPATIENT)
Dept: CARDIOLOGY | Facility: CLINIC | Age: 73
End: 2018-11-30
Payer: SELF-PAY

## 2018-11-30 PROCEDURE — PHASE4: CUSTOM

## 2018-12-03 ENCOUNTER — APPOINTMENT (OUTPATIENT)
Dept: CARDIOLOGY | Facility: CLINIC | Age: 73
End: 2018-12-03
Payer: SELF-PAY

## 2018-12-03 PROCEDURE — PHASE4: CUSTOM

## 2018-12-05 ENCOUNTER — APPOINTMENT (OUTPATIENT)
Dept: CARDIOLOGY | Facility: CLINIC | Age: 73
End: 2018-12-05
Payer: SELF-PAY

## 2018-12-05 PROCEDURE — PHASE4: CUSTOM

## 2018-12-07 ENCOUNTER — APPOINTMENT (OUTPATIENT)
Dept: CARDIOLOGY | Facility: CLINIC | Age: 73
End: 2018-12-07
Payer: SELF-PAY

## 2018-12-07 PROCEDURE — PHASE4: CUSTOM

## 2018-12-10 ENCOUNTER — APPOINTMENT (OUTPATIENT)
Dept: CARDIOLOGY | Facility: CLINIC | Age: 73
End: 2018-12-10

## 2018-12-12 ENCOUNTER — APPOINTMENT (OUTPATIENT)
Dept: CARDIOLOGY | Facility: CLINIC | Age: 73
End: 2018-12-12
Payer: SELF-PAY

## 2018-12-12 PROCEDURE — PHASE4: CUSTOM

## 2018-12-14 ENCOUNTER — APPOINTMENT (OUTPATIENT)
Dept: CARDIOLOGY | Facility: CLINIC | Age: 73
End: 2018-12-14
Payer: SELF-PAY

## 2018-12-14 PROCEDURE — PHASE4: CUSTOM

## 2018-12-17 ENCOUNTER — APPOINTMENT (OUTPATIENT)
Dept: CARDIOLOGY | Facility: CLINIC | Age: 73
End: 2018-12-17

## 2018-12-19 ENCOUNTER — APPOINTMENT (OUTPATIENT)
Dept: CARDIOLOGY | Facility: CLINIC | Age: 73
End: 2018-12-19
Payer: SELF-PAY

## 2018-12-19 PROCEDURE — PHASE4: CUSTOM

## 2018-12-21 ENCOUNTER — APPOINTMENT (OUTPATIENT)
Dept: CARDIOLOGY | Facility: CLINIC | Age: 73
End: 2018-12-21
Payer: SELF-PAY

## 2018-12-21 PROCEDURE — PHASE4: CUSTOM

## 2018-12-24 ENCOUNTER — APPOINTMENT (OUTPATIENT)
Dept: CARDIOLOGY | Facility: CLINIC | Age: 73
End: 2018-12-24

## 2018-12-28 ENCOUNTER — APPOINTMENT (OUTPATIENT)
Dept: CARDIOLOGY | Facility: CLINIC | Age: 73
End: 2018-12-28
Payer: SELF-PAY

## 2018-12-28 PROCEDURE — PHASE4: CUSTOM

## 2018-12-31 ENCOUNTER — APPOINTMENT (OUTPATIENT)
Dept: CARDIOLOGY | Facility: CLINIC | Age: 73
End: 2018-12-31
Payer: SELF-PAY

## 2018-12-31 PROCEDURE — PHASE4: CUSTOM

## 2019-01-02 ENCOUNTER — APPOINTMENT (OUTPATIENT)
Dept: CARDIOLOGY | Facility: CLINIC | Age: 74
End: 2019-01-02

## 2019-01-04 ENCOUNTER — APPOINTMENT (OUTPATIENT)
Dept: CARDIOLOGY | Facility: CLINIC | Age: 74
End: 2019-01-04

## 2019-01-04 ENCOUNTER — TRANSCRIPTION ENCOUNTER (OUTPATIENT)
Age: 74
End: 2019-01-04

## 2019-01-07 ENCOUNTER — APPOINTMENT (OUTPATIENT)
Dept: CARDIOLOGY | Facility: CLINIC | Age: 74
End: 2019-01-07
Payer: SELF-PAY

## 2019-01-07 PROCEDURE — PHASE4: CUSTOM

## 2019-01-09 ENCOUNTER — APPOINTMENT (OUTPATIENT)
Dept: CARDIOLOGY | Facility: CLINIC | Age: 74
End: 2019-01-09
Payer: SELF-PAY

## 2019-01-09 PROCEDURE — PHASE4: CUSTOM

## 2019-01-11 ENCOUNTER — APPOINTMENT (OUTPATIENT)
Dept: CARDIOLOGY | Facility: CLINIC | Age: 74
End: 2019-01-11
Payer: SELF-PAY

## 2019-01-11 PROCEDURE — PHASE4: CUSTOM

## 2019-01-14 ENCOUNTER — APPOINTMENT (OUTPATIENT)
Dept: CARDIOLOGY | Facility: CLINIC | Age: 74
End: 2019-01-14

## 2019-01-16 ENCOUNTER — APPOINTMENT (OUTPATIENT)
Dept: CARDIOLOGY | Facility: CLINIC | Age: 74
End: 2019-01-16
Payer: SELF-PAY

## 2019-01-16 PROCEDURE — PHASE4: CUSTOM

## 2019-01-18 ENCOUNTER — APPOINTMENT (OUTPATIENT)
Dept: CARDIOLOGY | Facility: CLINIC | Age: 74
End: 2019-01-18

## 2019-01-21 ENCOUNTER — APPOINTMENT (OUTPATIENT)
Dept: CARDIOLOGY | Facility: CLINIC | Age: 74
End: 2019-01-21
Payer: SELF-PAY

## 2019-01-21 PROCEDURE — PHASE4: CUSTOM

## 2019-01-23 ENCOUNTER — APPOINTMENT (OUTPATIENT)
Dept: CARDIOLOGY | Facility: CLINIC | Age: 74
End: 2019-01-23
Payer: SELF-PAY

## 2019-01-23 PROCEDURE — PHASE4: CUSTOM

## 2019-01-25 ENCOUNTER — APPOINTMENT (OUTPATIENT)
Dept: CARDIOLOGY | Facility: CLINIC | Age: 74
End: 2019-01-25

## 2019-01-28 ENCOUNTER — APPOINTMENT (OUTPATIENT)
Dept: CARDIOLOGY | Facility: CLINIC | Age: 74
End: 2019-01-28

## 2019-01-30 ENCOUNTER — APPOINTMENT (OUTPATIENT)
Dept: CARDIOLOGY | Facility: CLINIC | Age: 74
End: 2019-01-30

## 2019-02-01 ENCOUNTER — APPOINTMENT (OUTPATIENT)
Dept: CARDIOLOGY | Facility: CLINIC | Age: 74
End: 2019-02-01

## 2019-02-04 ENCOUNTER — APPOINTMENT (OUTPATIENT)
Dept: CARDIOLOGY | Facility: CLINIC | Age: 74
End: 2019-02-04

## 2019-02-06 ENCOUNTER — APPOINTMENT (OUTPATIENT)
Dept: CARDIOLOGY | Facility: CLINIC | Age: 74
End: 2019-02-06

## 2019-02-08 ENCOUNTER — APPOINTMENT (OUTPATIENT)
Dept: CARDIOLOGY | Facility: CLINIC | Age: 74
End: 2019-02-08

## 2019-02-11 ENCOUNTER — APPOINTMENT (OUTPATIENT)
Dept: CARDIOLOGY | Facility: CLINIC | Age: 74
End: 2019-02-11

## 2019-02-13 ENCOUNTER — APPOINTMENT (OUTPATIENT)
Dept: CARDIOLOGY | Facility: CLINIC | Age: 74
End: 2019-02-13

## 2019-02-15 ENCOUNTER — APPOINTMENT (OUTPATIENT)
Dept: CARDIOLOGY | Facility: CLINIC | Age: 74
End: 2019-02-15

## 2019-02-18 ENCOUNTER — APPOINTMENT (OUTPATIENT)
Dept: CARDIOLOGY | Facility: CLINIC | Age: 74
End: 2019-02-18

## 2019-02-20 ENCOUNTER — APPOINTMENT (OUTPATIENT)
Dept: CARDIOLOGY | Facility: CLINIC | Age: 74
End: 2019-02-20

## 2019-02-22 ENCOUNTER — APPOINTMENT (OUTPATIENT)
Dept: CARDIOLOGY | Facility: CLINIC | Age: 74
End: 2019-02-22

## 2019-02-25 ENCOUNTER — APPOINTMENT (OUTPATIENT)
Dept: CARDIOLOGY | Facility: CLINIC | Age: 74
End: 2019-02-25

## 2019-02-27 ENCOUNTER — APPOINTMENT (OUTPATIENT)
Dept: CARDIOLOGY | Facility: CLINIC | Age: 74
End: 2019-02-27

## 2022-12-22 ENCOUNTER — OFFICE (OUTPATIENT)
Dept: URBAN - METROPOLITAN AREA CLINIC 104 | Facility: CLINIC | Age: 77
Setting detail: OPHTHALMOLOGY
End: 2022-12-22
Payer: MEDICARE

## 2022-12-22 DIAGNOSIS — H26.493: ICD-10-CM

## 2022-12-22 DIAGNOSIS — H43.813: ICD-10-CM

## 2022-12-22 DIAGNOSIS — H35.3132: ICD-10-CM

## 2022-12-22 PROCEDURE — 92014 COMPRE OPH EXAM EST PT 1/>: CPT | Performed by: SPECIALIST

## 2022-12-22 PROCEDURE — 92134 CPTRZ OPH DX IMG PST SGM RTA: CPT | Performed by: SPECIALIST

## 2022-12-22 ASSESSMENT — TONOMETRY
OS_IOP_MMHG: 16
OD_IOP_MMHG: 16

## 2022-12-22 ASSESSMENT — REFRACTION_MANIFEST
OS_ADD: +2.75
OU_VA: 20/20
OS_CYLINDER: -0.25
OS_VA1: 20/25
OS_SPHERE: PL
OD_ADD: +2.75
OD_AXIS: 140
OD_SPHERE: -0.25
OD_CYLINDER: -0.25
OS_AXIS: 115
OD_VA1: 20/20

## 2022-12-22 ASSESSMENT — AXIALLENGTH_DERIVED
OD_AL: 24.3102
OD_AL: 24.3102

## 2022-12-22 ASSESSMENT — CONFRONTATIONAL VISUAL FIELD TEST (CVF)
OS_FINDINGS: FULL
OD_FINDINGS: FULL

## 2022-12-22 ASSESSMENT — VISUAL ACUITY
OS_BCVA: 20/30
OD_BCVA: 20/25-2

## 2022-12-22 ASSESSMENT — REFRACTION_CURRENTRX
OD_OVR_VA: 20/
OS_OVR_VA: 20/

## 2022-12-22 ASSESSMENT — REFRACTION_AUTOREFRACTION
OS_CYLINDER: -0.25
OD_CYLINDER: -0.25
OS_AXIS: 115
OS_SPHERE: PL
OD_AXIS: 138
OD_SPHERE: -0.25

## 2022-12-22 ASSESSMENT — KERATOMETRY
OS_K1POWER_DIOPTERS: 1.87
OS_AXISANGLE_DEGREES: 66
OS_K2POWER_DIOPTERS: 42.19
OD_K2POWER_DIOPTERS: 42.13
OD_AXISANGLE_DEGREES: 654
OD_K1POWER_DIOPTERS: 41.87

## 2022-12-22 ASSESSMENT — SPHEQUIV_DERIVED
OD_SPHEQUIV: -0.375
OD_SPHEQUIV: -0.375

## 2023-07-31 DIAGNOSIS — I77.9 DISORDER OF ARTERIES AND ARTERIOLES, UNSPECIFIED: ICD-10-CM

## 2023-07-31 DIAGNOSIS — C50.919 MALIGNANT NEOPLASM OF UNSPECIFIED SITE OF UNSPECIFIED FEMALE BREAST: ICD-10-CM

## 2023-07-31 DIAGNOSIS — C73 MALIGNANT NEOPLASM OF THYROID GLAND: ICD-10-CM

## 2023-07-31 DIAGNOSIS — Z86.79 PERSONAL HISTORY OF OTHER DISEASES OF THE CIRCULATORY SYSTEM: ICD-10-CM

## 2023-07-31 DIAGNOSIS — Z82.49 FAMILY HISTORY OF ISCHEMIC HEART DISEASE AND OTHER DISEASES OF THE CIRCULATORY SYSTEM: ICD-10-CM

## 2023-07-31 DIAGNOSIS — E66.9 OBESITY, UNSPECIFIED: ICD-10-CM

## 2023-07-31 DIAGNOSIS — Z78.9 OTHER SPECIFIED HEALTH STATUS: ICD-10-CM

## 2023-07-31 DIAGNOSIS — Z92.89 PERSONAL HISTORY OF OTHER MEDICAL TREATMENT: ICD-10-CM

## 2023-10-02 ENCOUNTER — TRANSCRIPTION ENCOUNTER (OUTPATIENT)
Age: 78
End: 2023-10-02

## 2023-10-18 ENCOUNTER — APPOINTMENT (OUTPATIENT)
Dept: CARDIOLOGY | Facility: CLINIC | Age: 78
End: 2023-10-18
Payer: MEDICARE

## 2023-10-18 VITALS
HEART RATE: 57 BPM | SYSTOLIC BLOOD PRESSURE: 132 MMHG | OXYGEN SATURATION: 99 % | HEIGHT: 64 IN | BODY MASS INDEX: 31.58 KG/M2 | WEIGHT: 185 LBS | DIASTOLIC BLOOD PRESSURE: 62 MMHG

## 2023-10-18 DIAGNOSIS — I10 ESSENTIAL (PRIMARY) HYPERTENSION: ICD-10-CM

## 2023-10-18 DIAGNOSIS — I65.29 OCCLUSION AND STENOSIS OF UNSPECIFIED CAROTID ARTERY: ICD-10-CM

## 2023-10-18 DIAGNOSIS — R01.1 CARDIAC MURMUR, UNSPECIFIED: ICD-10-CM

## 2023-10-18 PROCEDURE — 99214 OFFICE O/P EST MOD 30 MIN: CPT | Mod: 25

## 2023-10-18 PROCEDURE — 93000 ELECTROCARDIOGRAM COMPLETE: CPT

## 2023-10-18 RX ORDER — B-COMPLEX WITH VITAMIN C
TABLET ORAL DAILY
Refills: 0 | Status: ACTIVE | COMMUNITY

## 2023-10-18 RX ORDER — PSYLLIUM HUSK 0.4 G
CAPSULE ORAL
Refills: 0 | Status: DISCONTINUED | COMMUNITY
End: 2023-10-18

## 2023-10-18 RX ORDER — LEVOTHYROXINE SODIUM 88 UG/1
88 TABLET ORAL DAILY
Refills: 0 | Status: ACTIVE | COMMUNITY

## 2023-10-18 RX ORDER — ESCITALOPRAM OXALATE 5 MG/1
5 TABLET, FILM COATED ORAL DAILY
Refills: 0 | Status: ACTIVE | COMMUNITY

## 2023-10-18 RX ORDER — LEVOTHYROXINE SODIUM 125 UG/1
125 TABLET ORAL
Refills: 0 | Status: DISCONTINUED | COMMUNITY
End: 2023-10-18

## 2023-10-18 RX ORDER — CALCIUM CITRATE/VITAMIN D3 200MG-6.25
TABLET ORAL TWICE DAILY
Refills: 0 | Status: ACTIVE | COMMUNITY

## 2023-10-18 RX ORDER — MULTIVIT-MIN/FA/LYCOPEN/LUTEIN .4-300-25
TABLET ORAL DAILY
Refills: 0 | Status: ACTIVE | COMMUNITY

## 2023-10-18 RX ORDER — ENALAPRIL MALEATE 20 MG/1
20 TABLET ORAL
Refills: 0 | Status: DISCONTINUED | COMMUNITY
End: 2023-10-18

## 2023-10-18 RX ORDER — MEMANTINE HYDROCHLORIDE 10 MG/1
10 TABLET ORAL TWICE DAILY
Refills: 0 | Status: ACTIVE | COMMUNITY

## 2023-11-06 RX ORDER — ENALAPRIL MALEATE 5 MG/1
TABLET ORAL TWICE DAILY
Refills: 0 | Status: DISCONTINUED | COMMUNITY
End: 2023-11-06

## 2023-12-20 ENCOUNTER — APPOINTMENT (OUTPATIENT)
Dept: CARDIOLOGY | Facility: CLINIC | Age: 78
End: 2023-12-20
Payer: MEDICARE

## 2023-12-20 PROCEDURE — 93306 TTE W/DOPPLER COMPLETE: CPT

## 2023-12-20 PROCEDURE — 93880 EXTRACRANIAL BILAT STUDY: CPT

## 2023-12-20 NOTE — HISTORY OF PRESENT ILLNESS
[FreeTextEntry1] : The patient is a 78-year-old white female with a past medical history remarkable for hypertension, hypercholesterolemia, carotid artery disease, TIA, family history of premature coronary artery disease, and an abnormal ECG The patient has been exercising regularly.  She is chest pain and dyspnea free.  Her cholesterol from 10/28/2022 was acceptable.  I have requested more recent laboratories

## 2023-12-20 NOTE — DISCUSSION/SUMMARY
[FreeTextEntry1] : 1 abnormal ECG An echocardiogram was ordered to rule out a cardiomyopathy or valvular abnormality as the etiology of her abnormal ECG and murmur. 2 carotid artery disease A carotid ultrasound was ordered to rule out progression of her carotid artery disease. 3 hypertension Well-controlled.  Continue medical therapy 4 hypercholesterolemia More recent laboratory results requested 5 bradycardia We will continue to monitor for symptomatic bradycardia   TTM

## 2023-12-20 NOTE — CARDIOLOGY SUMMARY
[de-identified] : Sinus bradycardia, left axis deviation, left anterior fascicular block, poor R wave progression, no significant change from 4/10/2023 [de-identified] : Active Problems - Abnormal ECG - Nuclear stress testing not authorized 12/22/20, EST: 1/5/21, nondiagnostic, the patient exercised 1 min. and 47 seconds reaching only 75% of her MPHR, PHARMACOLOGIC NUCLEAR STRESS TEST: 1/26/21, normal, EF >65% - ECHOCARDIOGRAM: 12/20/2023, EF >65%, LAE, no stenotic or regurgitant lesions  (12/21/2021, outside office, EF 65-70%, trace to mild mitral regurgitation, trace tricuspid regurgitation RVSP 21) - Breast Cancer: lumpectomy/RT - CAROTID ULTRASOUND: 12/20/2023,  RECA <50%  (1/20/2022, RECA >50%, LECA <50%) - Hypertension: Secondary workup 12/15 unremarkable, MRA ABDOMEN: 12/14/15, 50% left renal artery stenosis - Obesity - Hypercholesterolemia - TIA - Family History of premature CAD: Father MI 51, sister MI 57 - Sick Sinus Syndrome: Bradycardia improved off of aricept. HOLTER: 3/10 SR 62, EVENT MONITOR: 8/14, 16 symptoms, 14 =SR, one an APC, one a couplet, NSR 66 (), one asymptomatic 5 beat run of SVT - Thyroid Cancer: s/p Total thyroidectomy ECG:Sinus bradycardia, poor R-wave progression, T-wave inversion lead L, no significant change from 10/11/2022

## 2023-12-27 ENCOUNTER — APPOINTMENT (OUTPATIENT)
Dept: CARDIOLOGY | Facility: CLINIC | Age: 78
End: 2023-12-27
Payer: MEDICARE

## 2023-12-27 DIAGNOSIS — I50.43 ACUTE ON CHRONIC COMBINED SYSTOLIC (CONGESTIVE) AND DIASTOLIC (CONGESTIVE) HEART FAILURE: ICD-10-CM

## 2023-12-27 PROCEDURE — 99441: CPT

## 2023-12-27 NOTE — CARDIOLOGY SUMMARY
[de-identified] : Active Problems - Abnormal ECG - Nuclear stress testing not authorized 12/22/20, EST: 1/5/21, nondiagnostic, the patient exercised 1 min. and 47 seconds reaching only 75% of her MPHR, PHARMACOLOGIC NUCLEAR STRESS TEST: 1/26/21, normal, EF >65% - ECHOCARDIOGRAM: 12/20/2023, EF >65%, LAE, no stenotic or regurgitant lesions   - Breast Cancer: lumpectomy/RT - CAROTID ULTRASOUND: 12/20/2023,  RECA <50%   - Hypertension: Secondary workup 12/15 unremarkable, MRA ABDOMEN: 12/14/15, 50% left renal artery stenosis - Obesity - Hypercholesterolemia - TIA - Family History of premature CAD: Father MI 51, sister MI 57 - Sick Sinus Syndrome: Bradycardia improved off of aricept. HOLTER: 3/10 SR 62, EVENT MONITOR: 8/14, 16 symptoms, 14 =SR, one an APC, one a couplet, NSR 66 (), one asymptomatic 5 beat run of SVT - Thyroid Cancer: s/p Total thyroidectomy ECG:Sinus bradycardia, poor R-wave progression, T-wave inversion lead L, no significant change from 10/11/2022

## 2023-12-27 NOTE — DISCUSSION/SUMMARY
[FreeTextEntry1] : 1 abnormal ECG Normal LV function by echocardiography  Normal nuclear stress test 1/26/2021  2 carotid artery disease Nonobstructive disease.  Continue medical therapy  3 hypertension Well-controlled.  Continue medical therapy 4 hypercholesterolemia At target 9/26/2023 5 bradycardia We will continue to monitor for symptomatic bradycardia  RTO 6 months

## 2023-12-27 NOTE — HISTORY OF PRESENT ILLNESS
[FreeTextEntry1] : The patient is a 78-year-old white female with a past medical history remarkable for hypertension, hypercholesterolemia, carotid artery disease, TIA, family history of premature coronary artery disease, and an abnormal ECG Echocardiography demonstrated a normal ejection fraction and the absence of valve disease.  A carotid ultrasound demonstrated nonobstructive disease.The patient's cholesterol from 9/26/2023 was reviewed.  It is at target. The patient has been exercising regularly.  She is chest pain and dyspnea free.   I reviewed the results with the patient.  A plan was developed.  Her questions were answered.  We spoke on the phone for 5 minutes.  She consented to a telephone telemedicine visit

## 2024-01-02 ENCOUNTER — RX RENEWAL (OUTPATIENT)
Age: 79
End: 2024-01-02

## 2024-01-02 RX ORDER — ATORVASTATIN CALCIUM 40 MG/1
40 TABLET, FILM COATED ORAL
Qty: 90 | Refills: 3 | Status: ACTIVE | COMMUNITY
Start: 2024-01-02 | End: 1900-01-01

## 2024-06-24 ENCOUNTER — RX RENEWAL (OUTPATIENT)
Age: 79
End: 2024-06-24

## 2024-06-27 ENCOUNTER — APPOINTMENT (OUTPATIENT)
Dept: CARDIOLOGY | Facility: CLINIC | Age: 79
End: 2024-06-27
Payer: MEDICARE

## 2024-06-27 VITALS
HEIGHT: 64 IN | SYSTOLIC BLOOD PRESSURE: 150 MMHG | WEIGHT: 189 LBS | BODY MASS INDEX: 32.27 KG/M2 | HEART RATE: 58 BPM | OXYGEN SATURATION: 99 % | DIASTOLIC BLOOD PRESSURE: 72 MMHG

## 2024-06-27 VITALS — DIASTOLIC BLOOD PRESSURE: 68 MMHG | SYSTOLIC BLOOD PRESSURE: 128 MMHG

## 2024-06-27 DIAGNOSIS — G45.9 TRANSIENT CEREBRAL ISCHEMIC ATTACK, UNSPECIFIED: ICD-10-CM

## 2024-06-27 DIAGNOSIS — I10 ESSENTIAL (PRIMARY) HYPERTENSION: ICD-10-CM

## 2024-06-27 DIAGNOSIS — E78.5 HYPERLIPIDEMIA, UNSPECIFIED: ICD-10-CM

## 2024-06-27 DIAGNOSIS — R94.31 ABNORMAL ELECTROCARDIOGRAM [ECG] [EKG]: ICD-10-CM

## 2024-06-27 PROCEDURE — 93000 ELECTROCARDIOGRAM COMPLETE: CPT

## 2024-06-27 PROCEDURE — 99214 OFFICE O/P EST MOD 30 MIN: CPT | Mod: 25

## 2024-07-19 ENCOUNTER — APPOINTMENT (OUTPATIENT)
Dept: ORTHOPEDIC SURGERY | Facility: CLINIC | Age: 79
End: 2024-07-19
Payer: MEDICARE

## 2024-07-19 VITALS — BODY MASS INDEX: 32.27 KG/M2 | WEIGHT: 189 LBS | HEIGHT: 64 IN

## 2024-07-19 DIAGNOSIS — M16.11 UNILATERAL PRIMARY OSTEOARTHRITIS, RIGHT HIP: ICD-10-CM

## 2024-07-19 DIAGNOSIS — Z85.3 PERSONAL HISTORY OF MALIGNANT NEOPLASM OF BREAST: ICD-10-CM

## 2024-07-19 DIAGNOSIS — M41.9 SCOLIOSIS, UNSPECIFIED: ICD-10-CM

## 2024-07-19 DIAGNOSIS — M25.551 PAIN IN RIGHT HIP: ICD-10-CM

## 2024-07-19 DIAGNOSIS — M47.816 SPONDYLOSIS W/OUT MYELOPATHY OR RADICULOPATHY, LUMBAR REGION: ICD-10-CM

## 2024-07-19 PROCEDURE — 73503 X-RAY EXAM HIP UNI 4/> VIEWS: CPT | Mod: RT

## 2024-07-19 PROCEDURE — 99204 OFFICE O/P NEW MOD 45 MIN: CPT

## 2024-07-19 PROCEDURE — 72100 X-RAY EXAM L-S SPINE 2/3 VWS: CPT

## 2024-07-19 RX ORDER — MELOXICAM 15 MG/1
15 TABLET ORAL DAILY
Qty: 30 | Refills: 1 | Status: ACTIVE | COMMUNITY
Start: 2024-07-19 | End: 1900-01-01

## 2024-08-20 ENCOUNTER — APPOINTMENT (OUTPATIENT)
Dept: ORTHOPEDIC SURGERY | Facility: CLINIC | Age: 79
End: 2024-08-20
Payer: MEDICARE

## 2024-08-20 VITALS — WEIGHT: 189 LBS | BODY MASS INDEX: 32.27 KG/M2 | HEIGHT: 64 IN

## 2024-08-20 DIAGNOSIS — M47.816 SPONDYLOSIS W/OUT MYELOPATHY OR RADICULOPATHY, LUMBAR REGION: ICD-10-CM

## 2024-08-20 DIAGNOSIS — M17.12 UNILATERAL PRIMARY OSTEOARTHRITIS, LEFT KNEE: ICD-10-CM

## 2024-08-20 DIAGNOSIS — M25.562 PAIN IN LEFT KNEE: ICD-10-CM

## 2024-08-20 DIAGNOSIS — M16.11 UNILATERAL PRIMARY OSTEOARTHRITIS, RIGHT HIP: ICD-10-CM

## 2024-08-20 DIAGNOSIS — M41.9 SCOLIOSIS, UNSPECIFIED: ICD-10-CM

## 2024-08-20 PROCEDURE — 99214 OFFICE O/P EST MOD 30 MIN: CPT | Mod: 25

## 2024-08-20 PROCEDURE — 73562 X-RAY EXAM OF KNEE 3: CPT | Mod: LT

## 2024-08-20 PROCEDURE — 20611 DRAIN/INJ JOINT/BURSA W/US: CPT | Mod: LT

## 2024-08-20 PROCEDURE — J3490M: CUSTOM

## 2024-08-20 RX ORDER — DICLOFENAC SODIUM AND MISOPROSTOL 75; 200 MG/1; UG/1
75-0.2 TABLET, DELAYED RELEASE ORAL
Qty: 60 | Refills: 0 | Status: ACTIVE | COMMUNITY
Start: 2024-08-20 | End: 1900-01-01

## 2024-08-20 NOTE — DISCUSSION/SUMMARY
[de-identified] : The patient was advised of the diagnosis.  The natural history of the pathology was explained in full to the patient in layman's terms. All questions were answered.  The risks and benefits of surgical and non-surgical treatment alternatives were explained in full to the patient.  The natural progression of Osteoarthritis was explained to the patient.  We discussed the possible treatment options from conservative to operative.  These included NSAIDS, Glucosamine and Chondrotin sulfate, and Physical Therapy as well different types of injections.  We also discussed that at some point they may progress to needed a SAAD.  Information and pamphlets were given when approprate.  The risks, benefits, contents and alternatives to injection were explained in full to the patient.  Risks outlined include but are not limited to infection, sepsis, bleeding, scarring, skin discoloration, temporary increase in pain, syncopal episode, failure to resolve symptoms, allergic reaction, flare reaction, permanent white skin discoloration, symptom recurrence, and elevation of blood sugar in diabetics.  Patient understood the risks.  All questions were answered.  After discussion of options, patient requested an injection.  Oral informed consent was obtained and sterile prep was done of the injection site.  Sterile technique was used to introduce the mixture.  Patient tolerated the procedure well.  Patient advised to ice the injection site this evening.  Signs and symptoms of infection reviewed and patient advised to call immediately for redness, fevers, and/or chills.  Entered by Aleksandra Bustillos acting as a scribe.

## 2024-08-20 NOTE — PROCEDURE
[FreeTextEntry3] : Large joint injection was performed on the _LEFT_ knee. The indication for this procedure was pain, inflammation, and x-ray evidence of Osteoarthritis on this or prior visit. The site was prepped with betadine, ethyl chloride sprayed topically, and sterile technique used. An injection of Lidocaine 3cc of 1%, Bupivacaine (Marcaine) 5cc of 0.25%, Methylprednisolone (Depomedrol) cc of 80 mg was used. Patient was advised to call if redness, pain, or fever occur, apply ice for 15 minutes out of every hour for the next 12-24 hours as tolerated and patient was advised to rest the joint(s) for days. Patient has tried OTC's including aspirin, Ibuprofen, Aleve, etc. or prescription NSAIDS, and/or exercises at home and/or physical therapy without satisfactory response and patient had decreased mobility in the joint. Ultrasound guidance was indicated for this patient due to better visualize joint space. All ultrasound images have been permanently captured and stored accordingly in our picture.   Aspiration of the knee was performed using an 18-gauge needle under sterile conditions. ------------cc of ---------------fluid was aspirated (this was sent for cell count, culture, gram stain, and crystals) No

## 2024-08-20 NOTE — ASSESSMENT
[FreeTextEntry1] : 7/19/24: Pt with mod/adv RT hip OA and Left SAAD done 8 years ago at [facility] ,also with lumbar ddd. Her pain seems to be combination of her hip and spine disease. Would like her to try a course of PT and I am recommending the use of Meloxicam 15mg daily and discussed the risks and benefits of the medication, patient elects to proceed with this. Discussed possible IA hip csi to eval origin of pain hip vs spine but will proceed conservatively for now with antiinflammatory and PT. F/up 1 month  8/20/24: Mod/adv RT hip OA and adv LT knee OA. Meloxicam caused GI upset- will try arthrotec instead. Also will proceed with LT knee csi today- sheryl well. Cont with PT as tolerated. F/up 6 weeks

## 2024-08-20 NOTE — DISCUSSION/SUMMARY
[de-identified] : The patient was advised of the diagnosis.  The natural history of the pathology was explained in full to the patient in layman's terms. All questions were answered.  The risks and benefits of surgical and non-surgical treatment alternatives were explained in full to the patient.  The natural progression of Osteoarthritis was explained to the patient.  We discussed the possible treatment options from conservative to operative.  These included NSAIDS, Glucosamine and Chondrotin sulfate, and Physical Therapy as well different types of injections.  We also discussed that at some point they may progress to needed a SAAD.  Information and pamphlets were given when approprate.  The risks, benefits, contents and alternatives to injection were explained in full to the patient.  Risks outlined include but are not limited to infection, sepsis, bleeding, scarring, skin discoloration, temporary increase in pain, syncopal episode, failure to resolve symptoms, allergic reaction, flare reaction, permanent white skin discoloration, symptom recurrence, and elevation of blood sugar in diabetics.  Patient understood the risks.  All questions were answered.  After discussion of options, patient requested an injection.  Oral informed consent was obtained and sterile prep was done of the injection site.  Sterile technique was used to introduce the mixture.  Patient tolerated the procedure well.  Patient advised to ice the injection site this evening.  Signs and symptoms of infection reviewed and patient advised to call immediately for redness, fevers, and/or chills.  Entered by Aleksandra Bustillos acting as a scribe.

## 2024-08-20 NOTE — HISTORY OF PRESENT ILLNESS
[de-identified] : 8/20/24: Pt with mod/adv RT hip OA and lumbar DDD. Pt states she tried Meloxicam for about 3 days and it upset her GI so she has since discontinued but feels PT has been beneficial. Also reports worsening LT knee pain- no formal tx thus far. Most pain in the knee posteriorly. Pain with sit to stand and getting in and out of the car.   7/19/24: 79F here today for RT hip pain x 1 year worsening in the past 3 months. No specific injury/trauma. Feels she is limping, walks very slowly. Most pain laterally and in the buttocks, occ groin pain. No lower back pain. Taking tylenol with mild benefit. Occ advil. Ambulates without assistance.   Hx of L SAAD at [facility] 8 years ago [FreeTextEntry5] : no pain in right hip. now complain about left knee.  [de-identified] : therapy

## 2024-08-20 NOTE — IMAGING
[de-identified] : RIGHT HIP +2 pt pulses no edema pos impingement with loss of IR normal flexion no pain with resisted SLR no tenderness to palpation 5/5 strength sensation intact  LUMBAR nontender l-spine  LEFT KNEE moderate effusion ROM 8-95 medial and lateral joint line tenderness +2 pt pulses good strength NVI  XR RT HIP (7/19/24) showing mod/adv OA XR L-SPINE (7/19/24) showing degen scoliosis with degen disc changes XR LT KNEE (8/20/24) showing advanced OA

## 2024-08-20 NOTE — IMAGING
[de-identified] : RIGHT HIP +2 pt pulses no edema pos impingement with loss of IR normal flexion no pain with resisted SLR no tenderness to palpation 5/5 strength sensation intact  LUMBAR nontender l-spine  LEFT KNEE moderate effusion ROM 8-95 medial and lateral joint line tenderness +2 pt pulses good strength NVI  XR RT HIP (7/19/24) showing mod/adv OA XR L-SPINE (7/19/24) showing degen scoliosis with degen disc changes XR LT KNEE (8/20/24) showing advanced OA

## 2024-08-20 NOTE — ASSESSMENT
[FreeTextEntry1] : 7/19/24: Pt with mod/adv RT hip OA and Left SAAD done 8 years ago at [facility] ,also with lumbar ddd. Her pain seems to be combination of her hip and spine disease. Would like her to try a course of PT and I am recommending the use of Meloxicam 15mg daily and discussed the risks and benefits of the medication, patient elects to proceed with this. Discussed possible IA hip csi to eval origin of pain hip vs spine but will proceed conservatively for now with antiinflammatory and PT. F/up 1 month  8/20/24: Mod/adv RT hip OA and adv LT knee OA. Meloxicam caused GI upset- will try arthrotec instead. Also will proceed with LT knee csi today- sheryl well. Cont with PT as tolerated. F/up 6 weeks

## 2024-08-20 NOTE — HISTORY OF PRESENT ILLNESS
[de-identified] : 8/20/24: Pt with mod/adv RT hip OA and lumbar DDD. Pt states she tried Meloxicam for about 3 days and it upset her GI so she has since discontinued but feels PT has been beneficial. Also reports worsening LT knee pain- no formal tx thus far. Most pain in the knee posteriorly. Pain with sit to stand and getting in and out of the car.   7/19/24: 79F here today for RT hip pain x 1 year worsening in the past 3 months. No specific injury/trauma. Feels she is limping, walks very slowly. Most pain laterally and in the buttocks, occ groin pain. No lower back pain. Taking tylenol with mild benefit. Occ advil. Ambulates without assistance.   [facility] [FreeTextEntry5] : no pain in right hip. now complain about left knee.  [de-identified] : therapy

## 2024-11-19 ENCOUNTER — APPOINTMENT (OUTPATIENT)
Dept: ORTHOPEDIC SURGERY | Facility: CLINIC | Age: 79
End: 2024-11-19
Payer: MEDICARE

## 2024-11-19 VITALS — WEIGHT: 189 LBS | HEIGHT: 64 IN | BODY MASS INDEX: 32.27 KG/M2

## 2024-11-19 DIAGNOSIS — M17.12 UNILATERAL PRIMARY OSTEOARTHRITIS, LEFT KNEE: ICD-10-CM

## 2024-11-19 PROCEDURE — 20611 DRAIN/INJ JOINT/BURSA W/US: CPT | Mod: LT

## 2024-11-19 PROCEDURE — J3490M: CUSTOM

## 2024-11-19 PROCEDURE — 99214 OFFICE O/P EST MOD 30 MIN: CPT | Mod: 25

## 2024-12-03 ENCOUNTER — APPOINTMENT (OUTPATIENT)
Dept: CARDIOLOGY | Facility: CLINIC | Age: 79
End: 2024-12-03
Payer: MEDICARE

## 2024-12-03 VITALS
BODY MASS INDEX: 33.49 KG/M2 | HEART RATE: 55 BPM | OXYGEN SATURATION: 95 % | DIASTOLIC BLOOD PRESSURE: 60 MMHG | HEIGHT: 63 IN | WEIGHT: 189 LBS | SYSTOLIC BLOOD PRESSURE: 128 MMHG

## 2024-12-03 DIAGNOSIS — R94.31 ABNORMAL ELECTROCARDIOGRAM [ECG] [EKG]: ICD-10-CM

## 2024-12-03 DIAGNOSIS — E78.5 HYPERLIPIDEMIA, UNSPECIFIED: ICD-10-CM

## 2024-12-03 DIAGNOSIS — I10 ESSENTIAL (PRIMARY) HYPERTENSION: ICD-10-CM

## 2024-12-03 DIAGNOSIS — I77.9 DISORDER OF ARTERIES AND ARTERIOLES, UNSPECIFIED: ICD-10-CM

## 2024-12-03 DIAGNOSIS — R06.02 SHORTNESS OF BREATH: ICD-10-CM

## 2024-12-03 PROCEDURE — 99214 OFFICE O/P EST MOD 30 MIN: CPT | Mod: 25

## 2024-12-03 PROCEDURE — 93000 ELECTROCARDIOGRAM COMPLETE: CPT

## 2024-12-05 LAB
ALBUMIN SERPL ELPH-MCNC: 3.8 G/DL
ALP BLD-CCNC: 101 U/L
ALT SERPL-CCNC: 16 U/L
ANION GAP SERPL CALC-SCNC: 14 MMOL/L
AST SERPL-CCNC: 20 U/L
BILIRUB SERPL-MCNC: 0.6 MG/DL
BUN SERPL-MCNC: 17 MG/DL
CALCIUM SERPL-MCNC: 8.7 MG/DL
CHLORIDE SERPL-SCNC: 102 MMOL/L
CHOLEST SERPL-MCNC: 158 MG/DL
CO2 SERPL-SCNC: 26 MMOL/L
CREAT SERPL-MCNC: 0.75 MG/DL
EGFR: 81 ML/MIN/1.73M2
GLUCOSE SERPL-MCNC: 89 MG/DL
HDLC SERPL-MCNC: 79 MG/DL
LDLC SERPL CALC-MCNC: 67 MG/DL
NONHDLC SERPL-MCNC: 79 MG/DL
POTASSIUM SERPL-SCNC: 4.5 MMOL/L
PROT SERPL-MCNC: 6.1 G/DL
SODIUM SERPL-SCNC: 142 MMOL/L
TRIGL SERPL-MCNC: 63 MG/DL

## 2024-12-06 ENCOUNTER — RX RENEWAL (OUTPATIENT)
Age: 79
End: 2024-12-06

## 2025-01-28 ENCOUNTER — APPOINTMENT (OUTPATIENT)
Dept: ORTHOPEDIC SURGERY | Facility: CLINIC | Age: 80
End: 2025-01-28
Payer: MEDICARE

## 2025-01-28 DIAGNOSIS — M17.12 UNILATERAL PRIMARY OSTEOARTHRITIS, LEFT KNEE: ICD-10-CM

## 2025-01-28 PROCEDURE — 99213 OFFICE O/P EST LOW 20 MIN: CPT | Mod: 25

## 2025-01-28 PROCEDURE — J3490M: CUSTOM | Mod: JZ

## 2025-01-28 PROCEDURE — 20611 DRAIN/INJ JOINT/BURSA W/US: CPT | Mod: LT

## 2025-02-21 ENCOUNTER — APPOINTMENT (OUTPATIENT)
Dept: CARDIOLOGY | Facility: CLINIC | Age: 80
End: 2025-02-21

## 2025-03-07 ENCOUNTER — APPOINTMENT (OUTPATIENT)
Dept: ORTHOPEDIC SURGERY | Facility: CLINIC | Age: 80
End: 2025-03-07
Payer: MEDICARE

## 2025-03-07 VITALS — HEIGHT: 63 IN | BODY MASS INDEX: 33.49 KG/M2 | WEIGHT: 189 LBS

## 2025-03-07 DIAGNOSIS — M17.12 UNILATERAL PRIMARY OSTEOARTHRITIS, LEFT KNEE: ICD-10-CM

## 2025-03-07 PROCEDURE — 99213 OFFICE O/P EST LOW 20 MIN: CPT

## 2025-03-28 ENCOUNTER — APPOINTMENT (OUTPATIENT)
Dept: ORTHOPEDIC SURGERY | Facility: CLINIC | Age: 80
End: 2025-03-28
Payer: MEDICARE

## 2025-03-28 PROCEDURE — 20611 DRAIN/INJ JOINT/BURSA W/US: CPT | Mod: LT

## 2025-03-28 PROCEDURE — 20610 DRAIN/INJ JOINT/BURSA W/O US: CPT | Mod: LT

## 2025-04-04 ENCOUNTER — APPOINTMENT (OUTPATIENT)
Dept: ORTHOPEDIC SURGERY | Facility: CLINIC | Age: 80
End: 2025-04-04
Payer: MEDICARE

## 2025-04-04 ENCOUNTER — APPOINTMENT (OUTPATIENT)
Dept: ORTHOPEDIC SURGERY | Facility: CLINIC | Age: 80
End: 2025-04-04

## 2025-04-04 VITALS — HEIGHT: 63 IN | WEIGHT: 189 LBS | BODY MASS INDEX: 33.49 KG/M2

## 2025-04-04 DIAGNOSIS — M17.12 UNILATERAL PRIMARY OSTEOARTHRITIS, LEFT KNEE: ICD-10-CM

## 2025-04-04 PROCEDURE — 20610 DRAIN/INJ JOINT/BURSA W/O US: CPT | Mod: LT

## 2025-04-04 PROCEDURE — 20611 DRAIN/INJ JOINT/BURSA W/US: CPT | Mod: LT

## 2025-04-15 ENCOUNTER — APPOINTMENT (OUTPATIENT)
Dept: ORTHOPEDIC SURGERY | Facility: CLINIC | Age: 80
End: 2025-04-15
Payer: MEDICARE

## 2025-04-15 VITALS — BODY MASS INDEX: 33.49 KG/M2 | HEIGHT: 63 IN | WEIGHT: 189 LBS

## 2025-04-15 DIAGNOSIS — M17.12 UNILATERAL PRIMARY OSTEOARTHRITIS, LEFT KNEE: ICD-10-CM

## 2025-04-15 PROCEDURE — 20610 DRAIN/INJ JOINT/BURSA W/O US: CPT | Mod: LT

## 2025-04-15 PROCEDURE — 20611 DRAIN/INJ JOINT/BURSA W/US: CPT | Mod: LT

## 2025-04-29 ENCOUNTER — APPOINTMENT (OUTPATIENT)
Dept: ORTHOPEDIC SURGERY | Facility: CLINIC | Age: 80
End: 2025-04-29

## 2025-05-02 ENCOUNTER — APPOINTMENT (OUTPATIENT)
Dept: ORTHOPEDIC SURGERY | Facility: CLINIC | Age: 80
End: 2025-05-02

## 2025-06-03 ENCOUNTER — APPOINTMENT (OUTPATIENT)
Dept: CARDIOLOGY | Facility: CLINIC | Age: 80
End: 2025-06-03

## 2025-06-05 ENCOUNTER — APPOINTMENT (OUTPATIENT)
Dept: CARDIOLOGY | Facility: CLINIC | Age: 80
End: 2025-06-05

## 2025-06-10 ENCOUNTER — APPOINTMENT (OUTPATIENT)
Dept: ORTHOPEDIC SURGERY | Facility: CLINIC | Age: 80
End: 2025-06-10
Payer: MEDICARE

## 2025-06-10 PROCEDURE — 99214 OFFICE O/P EST MOD 30 MIN: CPT

## 2025-06-16 ENCOUNTER — APPOINTMENT (OUTPATIENT)
Dept: CT IMAGING | Facility: CLINIC | Age: 80
End: 2025-06-16
Payer: MEDICARE

## 2025-06-16 PROCEDURE — 73700 CT LOWER EXTREMITY W/O DYE: CPT | Mod: LT

## 2025-06-19 ENCOUNTER — NON-APPOINTMENT (OUTPATIENT)
Age: 80
End: 2025-06-19

## 2025-07-10 ENCOUNTER — APPOINTMENT (OUTPATIENT)
Dept: CARDIOLOGY | Facility: CLINIC | Age: 80
End: 2025-07-10
Payer: MEDICARE

## 2025-07-10 VITALS
BODY MASS INDEX: 33.49 KG/M2 | HEIGHT: 63 IN | SYSTOLIC BLOOD PRESSURE: 126 MMHG | OXYGEN SATURATION: 96 % | HEART RATE: 54 BPM | WEIGHT: 189 LBS | DIASTOLIC BLOOD PRESSURE: 70 MMHG

## 2025-07-10 PROCEDURE — 93242 EXT ECG>48HR<7D RECORDING: CPT

## 2025-07-10 PROCEDURE — 93000 ELECTROCARDIOGRAM COMPLETE: CPT | Mod: NC,59

## 2025-07-10 PROCEDURE — 99214 OFFICE O/P EST MOD 30 MIN: CPT | Mod: 25

## 2025-07-11 ENCOUNTER — OUTPATIENT (OUTPATIENT)
Dept: OUTPATIENT SERVICES | Facility: HOSPITAL | Age: 80
LOS: 1 days | End: 2025-07-11

## 2025-07-11 VITALS
OXYGEN SATURATION: 97 % | WEIGHT: 191.14 LBS | DIASTOLIC BLOOD PRESSURE: 74 MMHG | TEMPERATURE: 98 F | RESPIRATION RATE: 18 BRPM | HEIGHT: 65 IN | HEART RATE: 54 BPM | SYSTOLIC BLOOD PRESSURE: 160 MMHG

## 2025-07-11 DIAGNOSIS — Z98.890 OTHER SPECIFIED POSTPROCEDURAL STATES: Chronic | ICD-10-CM

## 2025-07-11 DIAGNOSIS — I10 ESSENTIAL (PRIMARY) HYPERTENSION: ICD-10-CM

## 2025-07-11 DIAGNOSIS — Z01.818 ENCOUNTER FOR OTHER PREPROCEDURAL EXAMINATION: ICD-10-CM

## 2025-07-11 DIAGNOSIS — E78.5 HYPERLIPIDEMIA, UNSPECIFIED: ICD-10-CM

## 2025-07-11 DIAGNOSIS — M17.12 UNILATERAL PRIMARY OSTEOARTHRITIS, LEFT KNEE: ICD-10-CM

## 2025-07-11 LAB
A1C WITH ESTIMATED AVERAGE GLUCOSE RESULT: 5.6 % — SIGNIFICANT CHANGE UP (ref 4–5.6)
ALBUMIN SERPL ELPH-MCNC: 3.4 G/DL — SIGNIFICANT CHANGE UP (ref 3.3–5)
ALP SERPL-CCNC: 101 U/L — SIGNIFICANT CHANGE UP (ref 40–120)
ALT FLD-CCNC: 24 U/L — SIGNIFICANT CHANGE UP (ref 12–78)
ANION GAP SERPL CALC-SCNC: 6 MMOL/L — SIGNIFICANT CHANGE UP (ref 5–17)
APTT BLD: 30.9 SEC — SIGNIFICANT CHANGE UP (ref 26.1–36.8)
AST SERPL-CCNC: 17 U/L — SIGNIFICANT CHANGE UP (ref 15–37)
BASOPHILS # BLD AUTO: 0.07 K/UL — SIGNIFICANT CHANGE UP (ref 0–0.2)
BASOPHILS NFR BLD AUTO: 1.1 % — SIGNIFICANT CHANGE UP (ref 0–2)
BILIRUB SERPL-MCNC: 0.7 MG/DL — SIGNIFICANT CHANGE UP (ref 0.2–1.2)
BUN SERPL-MCNC: 19 MG/DL — SIGNIFICANT CHANGE UP (ref 7–23)
CALCIUM SERPL-MCNC: 8.8 MG/DL — SIGNIFICANT CHANGE UP (ref 8.5–10.1)
CHLORIDE SERPL-SCNC: 103 MMOL/L — SIGNIFICANT CHANGE UP (ref 96–108)
CO2 SERPL-SCNC: 28 MMOL/L — SIGNIFICANT CHANGE UP (ref 22–31)
CREAT SERPL-MCNC: 0.83 MG/DL — SIGNIFICANT CHANGE UP (ref 0.5–1.3)
EGFR: 71 ML/MIN/1.73M2 — SIGNIFICANT CHANGE UP
EGFR: 71 ML/MIN/1.73M2 — SIGNIFICANT CHANGE UP
EOSINOPHIL # BLD AUTO: 0 K/UL — SIGNIFICANT CHANGE UP (ref 0–0.5)
EOSINOPHIL NFR BLD AUTO: 0 % — SIGNIFICANT CHANGE UP (ref 0–6)
ESTIMATED AVERAGE GLUCOSE: 114 MG/DL — SIGNIFICANT CHANGE UP (ref 68–114)
GLUCOSE SERPL-MCNC: 73 MG/DL — SIGNIFICANT CHANGE UP (ref 70–99)
HCT VFR BLD CALC: 39.5 % — SIGNIFICANT CHANGE UP (ref 34.5–45)
HGB BLD-MCNC: 12.9 G/DL — SIGNIFICANT CHANGE UP (ref 11.5–15.5)
IMM GRANULOCYTES NFR BLD AUTO: 0.3 % — SIGNIFICANT CHANGE UP (ref 0–0.9)
INR BLD: 0.97 RATIO — SIGNIFICANT CHANGE UP (ref 0.85–1.16)
LYMPHOCYTES # BLD AUTO: 1.23 K/UL — SIGNIFICANT CHANGE UP (ref 1–3.3)
LYMPHOCYTES # BLD AUTO: 18.6 % — SIGNIFICANT CHANGE UP (ref 13–44)
MCHC RBC-ENTMCNC: 32.7 G/DL — SIGNIFICANT CHANGE UP (ref 32–36)
MCHC RBC-ENTMCNC: 33.2 PG — SIGNIFICANT CHANGE UP (ref 27–34)
MCV RBC AUTO: 101.5 FL — HIGH (ref 80–100)
MONOCYTES # BLD AUTO: 0.69 K/UL — SIGNIFICANT CHANGE UP (ref 0–0.9)
MONOCYTES NFR BLD AUTO: 10.4 % — SIGNIFICANT CHANGE UP (ref 2–14)
NEUTROPHILS # BLD AUTO: 4.62 K/UL — SIGNIFICANT CHANGE UP (ref 1.8–7.4)
NEUTROPHILS NFR BLD AUTO: 69.6 % — SIGNIFICANT CHANGE UP (ref 43–77)
NRBC BLD AUTO-RTO: 0 /100 WBCS — SIGNIFICANT CHANGE UP (ref 0–0)
PLATELET # BLD AUTO: 227 K/UL — SIGNIFICANT CHANGE UP (ref 150–400)
POTASSIUM SERPL-MCNC: 4.7 MMOL/L — SIGNIFICANT CHANGE UP (ref 3.5–5.3)
POTASSIUM SERPL-SCNC: 4.7 MMOL/L — SIGNIFICANT CHANGE UP (ref 3.5–5.3)
PROT SERPL-MCNC: 7.2 GM/DL — SIGNIFICANT CHANGE UP (ref 6–8.3)
PROTHROM AB SERPL-ACNC: 11.2 SEC — SIGNIFICANT CHANGE UP (ref 9.9–13.4)
RBC # BLD: 3.89 M/UL — SIGNIFICANT CHANGE UP (ref 3.8–5.2)
RBC # FLD: 12.9 % — SIGNIFICANT CHANGE UP (ref 10.3–14.5)
SODIUM SERPL-SCNC: 137 MMOL/L — SIGNIFICANT CHANGE UP (ref 135–145)
WBC # BLD: 6.63 K/UL — SIGNIFICANT CHANGE UP (ref 3.8–10.5)
WBC # FLD AUTO: 6.63 K/UL — SIGNIFICANT CHANGE UP (ref 3.8–10.5)

## 2025-07-11 NOTE — OCCUPATIONAL THERAPY INITIAL EVALUATION ADULT - ADDITIONAL COMMENTS
Pt lives with spouse (Who can assist post op) in a condo with no steps to enter. Once inside, the pt has an elevator that takes the pt to the main floor where the condo is. The pts bathroom has a walk in shower stall, fixed/retractable shower head, standard toilet seat and grab bars. The pt ambulates with a cane all the time and does not own any other device for ambulation. The pt daily pain is a 5/10 at rest and a 5-6/10 with movement. The pt manages the pain with rest and Tylenol. The pt has no recent outpatient PT, no recent falls and has no buckling of the knees.

## 2025-07-11 NOTE — OCCUPATIONAL THERAPY INITIAL EVALUATION ADULT - PERTINENT HX OF CURRENT PROBLEM, REHAB EVAL
L knee OA which impacts pts ability to perform functional tasks/transfers and mobility. Pt is scheduled for L TKR on 7/31/25

## 2025-07-11 NOTE — H&P PST ADULT - HISTORY OF PRESENT ILLNESS
81 yo female , pmh- htn, hld, TIA  in 2006 c/o left knee pain 2/2 osteoarthritis - scheduled for  left knee arthroplasty  goal: to walk without pain

## 2025-07-11 NOTE — H&P PST ADULT - NSICDXPASTSURGICALHX_GEN_ALL_CORE_FT
PAST SURGICAL HISTORY:  H/O colonoscopy     H/O thyroidectomy     S/P breast biopsy     S/P skin cancer resection

## 2025-07-11 NOTE — H&P PST ADULT - PROBLEM SELECTOR PLAN 4
Assessment and Plan: labs - cbc,pt/ptt,bmp,t&s,nose cx,ekg  M/C required C/C  preop 3 day hibiclens instruction reviewed and given .instructed on if  nose cx positive use mupuricin 5 days and checklist given  take routine meds DOS with sips of water. avoid NSAID and OTC supplements. verbalized understanding  information on proper nutrition , increase protein and better food choices provided in packet  ensure clear  anesthesiologist to review pst labs, ekg, medical clearances and optimization for surgery

## 2025-07-11 NOTE — H&P PST ADULT - ASSESSMENT
left knee ostearthritis   CAPRINI SCORE    AGE RELATED RISK FACTORS                                                             [ ] Age 41-60 years                                            (1 Point)  [ ] Age: 61-74 years                                           (2 Points)                 [ x] Age= 75 years                                                (3 Points)             DISEASE RELATED RISK FACTORS                                                       [ ] Edema in the lower extremities                 (1 Point)                     [ ] Varicose veins                                               (1 Point)                                 [x ] BMI > 25 Kg/m2                                            (1 Point)                                  [ ] Serious infection (ie PNA)                            (1 Point)                     [ ] Lung disease ( COPD, Emphysema)            (1 Point)                                                                          [ ] Acute myocardial infarction                         (1 Point)                  [ ] Congestive heart failure (in the previous month)  (1 Point)         [ ] Inflammatory bowel disease                            (1 Point)                  [ ] Central venous access, PICC or Port               (2 points)       (within the last month)                                                                [ ] Stroke (in the previous month)                        (5 Points)    [ ] Previous or present malignancy                       (2 points)                                                                                                                                                         HEMATOLOGY RELATED FACTORS                                                         [ ] Prior episodes of VTE                                     (3 Points)                     [ ] Positive family history for VTE                      (3 Points)                  [ ] Prothrombin 91709 A                                     (3 Points)                     [ ] Factor V Leiden                                                (3 Points)                        [ ] Lupus anticoagulants                                      (3 Points)                                                           [ ] Anticardiolipin antibodies                              (3 Points)                                                       [ ] High homocysteine in the blood                   (3 Points)                                             [ ] Other congenital or acquired thrombophilia      (3 Points)                                                [ ] Heparin induced thrombocytopenia                  (3 Points)                                        MOBILITY RELATED FACTORS  [ ] Bed rest                                                         (1 Point)  [ ] Plaster cast                                                    (2 points)  [ ] Bed bound for more than 72 hours           (2 Points)    GENDER SPECIFIC FACTORS  [ ] Pregnancy or had a baby within the last month   (1 Point)  [ ] Post-partum < 6 weeks                                   (1 Point)  [ ] Hormonal therapy  or oral contraception   (1 Point)  [ ] History of pregnancy complications              (1 point)  [ ] Unexplained or recurrent              (1 Point)    OTHER RISK FACTORS                                           (1 Point)  [ ] BMI >40, smoking, diabetes requiring insulin, chemotherapy  blood transfusions and length of surgery over 2 hours    SURGERY RELATED RISK FACTORS  [ ]  Section within the last month     (1 Point)  [ ] Minor surgery                                                  (1 Point)  [ ] Arthroscopic surgery                                       (2 Points)  [ ] Planned major surgery lasting more            (2 Points)      than 45 minutes     [ x] Elective hip or knee joint replacement       (5 points)       surgery                                                TRAUMA RELATED RISK FACTORS  [ ] Fracture of the hip, pelvis, or leg                       (5 Points)  [ ] Spinal cord injury resulting in paralysis             (5 points)       (in the previous month)    [ ] Paralysis  (less than 1 month)                             (5 Points)  [ ] Multiple Trauma within 1 month                        (5 Points)    Total Score [      9  ]    Caprini Score 0-2: Low Risk, NO VTE prophylaxis required for most patients, encourage ambulation  Caprini Score 3-6: Moderate Risk , pharmacologic VTE prophylaxis is indicated for most patients (in the absence of contraindications)  Caprini Score Greater than or =7: High risk, pharmocologic VTE prophylaxis indicated for most patients (in the absence of contraindications)

## 2025-07-12 LAB
MRSA PCR RESULT.: DETECTED
S AUREUS DNA NOSE QL NAA+PROBE: DETECTED
VIT D25+D1,25 OH+D1,25 PNL SERPL-MCNC: 25.8 PG/ML — SIGNIFICANT CHANGE UP (ref 19.9–79.3)

## 2025-07-14 RX ORDER — MUPIROCIN CALCIUM 20 MG/G
1 CREAM TOPICAL
Qty: 22 | Refills: 0
Start: 2025-07-14 | End: 2025-07-18

## 2025-07-17 ENCOUNTER — NON-APPOINTMENT (OUTPATIENT)
Age: 80
End: 2025-07-17

## 2025-07-23 PROCEDURE — 93244 EXT ECG>48HR<7D REV&INTERPJ: CPT

## 2025-07-24 ENCOUNTER — APPOINTMENT (OUTPATIENT)
Dept: CARDIOLOGY | Facility: CLINIC | Age: 80
End: 2025-07-24
Payer: MEDICARE

## 2025-07-24 PROCEDURE — 78452 HT MUSCLE IMAGE SPECT MULT: CPT

## 2025-07-24 PROCEDURE — 93015 CV STRESS TEST SUPVJ I&R: CPT

## 2025-07-24 PROCEDURE — A9502: CPT

## 2025-07-25 PROBLEM — E78.5 HYPERLIPIDEMIA, UNSPECIFIED: Chronic | Status: ACTIVE | Noted: 2025-07-11

## 2025-07-25 PROBLEM — I10 ESSENTIAL (PRIMARY) HYPERTENSION: Chronic | Status: ACTIVE | Noted: 2025-07-11

## 2025-07-28 ENCOUNTER — APPOINTMENT (OUTPATIENT)
Dept: CARDIOLOGY | Facility: CLINIC | Age: 80
End: 2025-07-28
Payer: MEDICARE

## 2025-07-28 DIAGNOSIS — Z01.810 ENCOUNTER FOR PREPROCEDURAL CARDIOVASCULAR EXAMINATION: ICD-10-CM

## 2025-07-28 DIAGNOSIS — E78.5 HYPERLIPIDEMIA, UNSPECIFIED: ICD-10-CM

## 2025-07-28 DIAGNOSIS — I10 ESSENTIAL (PRIMARY) HYPERTENSION: ICD-10-CM

## 2025-07-28 DIAGNOSIS — I49.8 OTHER SPECIFIED CARDIAC ARRHYTHMIAS: ICD-10-CM

## 2025-07-28 DIAGNOSIS — R00.1 BRADYCARDIA, UNSPECIFIED: ICD-10-CM

## 2025-07-28 PROCEDURE — 99212 OFFICE O/P EST SF 10 MIN: CPT | Mod: 93

## 2025-07-30 RX ORDER — MELATONIN 5 MG
3 TABLET ORAL AT BEDTIME
Refills: 0 | Status: DISCONTINUED | OUTPATIENT
Start: 2025-07-31 | End: 2025-08-01

## 2025-07-30 RX ORDER — LEVOTHYROXINE SODIUM 300 MCG
88 TABLET ORAL DAILY
Refills: 0 | Status: DISCONTINUED | OUTPATIENT
Start: 2025-07-31 | End: 2025-08-01

## 2025-07-30 RX ORDER — OXYCODONE HYDROCHLORIDE 30 MG/1
10 TABLET ORAL
Refills: 0 | Status: DISCONTINUED | OUTPATIENT
Start: 2025-07-31 | End: 2025-08-01

## 2025-07-30 RX ORDER — DEXAMETHASONE 0.5 MG/1
10 TABLET ORAL ONCE
Refills: 0 | Status: COMPLETED | OUTPATIENT
Start: 2025-08-01 | End: 2025-08-01

## 2025-07-30 RX ORDER — KETOROLAC TROMETHAMINE 30 MG/ML
30 INJECTION, SOLUTION INTRAMUSCULAR; INTRAVENOUS EVERY 8 HOURS
Refills: 0 | Status: DISCONTINUED | OUTPATIENT
Start: 2025-07-31 | End: 2025-08-01

## 2025-07-30 RX ORDER — MAGNESIUM HYDROXIDE 400 MG/5ML
30 SUSPENSION ORAL DAILY
Refills: 0 | Status: DISCONTINUED | OUTPATIENT
Start: 2025-07-31 | End: 2025-08-01

## 2025-07-30 RX ORDER — ATORVASTATIN CALCIUM 80 MG/1
40 TABLET, FILM COATED ORAL AT BEDTIME
Refills: 0 | Status: DISCONTINUED | OUTPATIENT
Start: 2025-07-31 | End: 2025-08-01

## 2025-07-30 RX ORDER — B1/B2/B3/B5/B6/B12/VIT C/FOLIC 500-0.5 MG
1 TABLET ORAL DAILY
Refills: 0 | Status: DISCONTINUED | OUTPATIENT
Start: 2025-07-31 | End: 2025-08-01

## 2025-07-30 RX ORDER — AMLODIPINE BESYLATE 10 MG/1
5 TABLET ORAL DAILY
Refills: 0 | Status: DISCONTINUED | OUTPATIENT
Start: 2025-07-31 | End: 2025-08-01

## 2025-07-30 RX ORDER — ACETAMINOPHEN 500 MG/5ML
1000 LIQUID (ML) ORAL ONCE
Refills: 0 | Status: COMPLETED | OUTPATIENT
Start: 2025-07-31 | End: 2025-07-31

## 2025-07-30 RX ORDER — ACETAMINOPHEN 500 MG/5ML
1000 LIQUID (ML) ORAL EVERY 8 HOURS
Refills: 0 | Status: DISCONTINUED | OUTPATIENT
Start: 2025-07-31 | End: 2025-08-01

## 2025-07-30 RX ORDER — ONDANSETRON HCL/PF 4 MG/2 ML
4 VIAL (ML) INJECTION EVERY 6 HOURS
Refills: 0 | Status: DISCONTINUED | OUTPATIENT
Start: 2025-07-31 | End: 2025-08-01

## 2025-07-30 RX ORDER — ENALAPRIL MALEATE 20 MG
20 TABLET ORAL DAILY
Refills: 0 | Status: DISCONTINUED | OUTPATIENT
Start: 2025-07-31 | End: 2025-07-31

## 2025-07-30 RX ORDER — ASPIRIN 325 MG
81 TABLET ORAL
Refills: 0 | Status: DISCONTINUED | OUTPATIENT
Start: 2025-08-01 | End: 2025-08-01

## 2025-07-30 RX ORDER — SENNA 187 MG
2 TABLET ORAL AT BEDTIME
Refills: 0 | Status: DISCONTINUED | OUTPATIENT
Start: 2025-07-31 | End: 2025-08-01

## 2025-07-30 RX ORDER — MEMANTINE HYDROCHLORIDE 21 MG/1
10 CAPSULE, EXTENDED RELEASE ORAL
Refills: 0 | Status: DISCONTINUED | OUTPATIENT
Start: 2025-07-31 | End: 2025-08-01

## 2025-07-30 RX ORDER — ESCITALOPRAM OXALATE 20 MG/1
10 TABLET ORAL DAILY
Refills: 0 | Status: DISCONTINUED | OUTPATIENT
Start: 2025-07-31 | End: 2025-08-01

## 2025-07-30 RX ORDER — OXYCODONE HYDROCHLORIDE 30 MG/1
5 TABLET ORAL
Refills: 0 | Status: DISCONTINUED | OUTPATIENT
Start: 2025-07-31 | End: 2025-08-01

## 2025-07-30 RX ORDER — POLYETHYLENE GLYCOL 3350 17 G/17G
17 POWDER, FOR SOLUTION ORAL AT BEDTIME
Refills: 0 | Status: DISCONTINUED | OUTPATIENT
Start: 2025-07-31 | End: 2025-08-01

## 2025-07-30 RX ORDER — BISACODYL 5 MG
10 TABLET, DELAYED RELEASE (ENTERIC COATED) ORAL ONCE
Refills: 0 | Status: DISCONTINUED | OUTPATIENT
Start: 2025-08-02 | End: 2025-08-01

## 2025-07-30 RX ORDER — CELECOXIB 50 MG/1
200 CAPSULE ORAL EVERY 12 HOURS
Refills: 0 | Status: DISCONTINUED | OUTPATIENT
Start: 2025-08-01 | End: 2025-08-01

## 2025-07-30 RX ORDER — OXYCODONE HYDROCHLORIDE 30 MG/1
15 TABLET ORAL EVERY 4 HOURS
Refills: 0 | Status: DISCONTINUED | OUTPATIENT
Start: 2025-07-31 | End: 2025-08-01

## 2025-07-31 ENCOUNTER — TRANSCRIPTION ENCOUNTER (OUTPATIENT)
Age: 80
End: 2025-07-31

## 2025-07-31 ENCOUNTER — OUTPATIENT (OUTPATIENT)
Dept: OUTPATIENT SERVICES | Facility: HOSPITAL | Age: 80
LOS: 1 days | Discharge: HOME | End: 2025-07-31
Payer: MEDICARE

## 2025-07-31 ENCOUNTER — APPOINTMENT (OUTPATIENT)
Dept: ORTHOPEDIC SURGERY | Facility: HOSPITAL | Age: 80
End: 2025-07-31
Payer: MEDICARE

## 2025-07-31 ENCOUNTER — RESULT REVIEW (OUTPATIENT)
Age: 80
End: 2025-07-31

## 2025-07-31 VITALS
DIASTOLIC BLOOD PRESSURE: 77 MMHG | RESPIRATION RATE: 16 BRPM | HEART RATE: 56 BPM | HEIGHT: 65 IN | SYSTOLIC BLOOD PRESSURE: 146 MMHG | TEMPERATURE: 97 F | OXYGEN SATURATION: 97 % | WEIGHT: 191.14 LBS

## 2025-07-31 DIAGNOSIS — Z98.890 OTHER SPECIFIED POSTPROCEDURAL STATES: Chronic | ICD-10-CM

## 2025-07-31 LAB
ANION GAP SERPL CALC-SCNC: 9 MMOL/L — SIGNIFICANT CHANGE UP (ref 5–17)
BUN SERPL-MCNC: 21 MG/DL — SIGNIFICANT CHANGE UP (ref 7–23)
CALCIUM SERPL-MCNC: 8.4 MG/DL — LOW (ref 8.5–10.1)
CHLORIDE SERPL-SCNC: 106 MMOL/L — SIGNIFICANT CHANGE UP (ref 96–108)
CK MB BLD-MCNC: 2.6 % — SIGNIFICANT CHANGE UP (ref 0–3.5)
CK MB CFR SERPL CALC: 2 NG/ML — SIGNIFICANT CHANGE UP (ref 0.5–3.6)
CK SERPL-CCNC: 76 U/L — SIGNIFICANT CHANGE UP (ref 26–192)
CO2 SERPL-SCNC: 23 MMOL/L — SIGNIFICANT CHANGE UP (ref 22–31)
CREAT SERPL-MCNC: 0.77 MG/DL — SIGNIFICANT CHANGE UP (ref 0.5–1.3)
EGFR: 78 ML/MIN/1.73M2 — SIGNIFICANT CHANGE UP
EGFR: 78 ML/MIN/1.73M2 — SIGNIFICANT CHANGE UP
GLUCOSE BLDC GLUCOMTR-MCNC: 95 MG/DL — SIGNIFICANT CHANGE UP (ref 70–99)
GLUCOSE SERPL-MCNC: 108 MG/DL — HIGH (ref 70–99)
HCT VFR BLD CALC: 35.2 % — SIGNIFICANT CHANGE UP (ref 34.5–45)
HGB BLD-MCNC: 11.4 G/DL — LOW (ref 11.5–15.5)
MCHC RBC-ENTMCNC: 32.4 G/DL — SIGNIFICANT CHANGE UP (ref 32–36)
MCHC RBC-ENTMCNC: 33 PG — SIGNIFICANT CHANGE UP (ref 27–34)
MCV RBC AUTO: 102 FL — HIGH (ref 80–100)
NRBC BLD AUTO-RTO: 0 /100 WBCS — SIGNIFICANT CHANGE UP (ref 0–0)
PLATELET # BLD AUTO: 206 K/UL — SIGNIFICANT CHANGE UP (ref 150–400)
POTASSIUM SERPL-MCNC: 3.9 MMOL/L — SIGNIFICANT CHANGE UP (ref 3.5–5.3)
POTASSIUM SERPL-SCNC: 3.9 MMOL/L — SIGNIFICANT CHANGE UP (ref 3.5–5.3)
RBC # BLD: 3.45 M/UL — LOW (ref 3.8–5.2)
RBC # FLD: 12.6 % — SIGNIFICANT CHANGE UP (ref 10.3–14.5)
SODIUM SERPL-SCNC: 138 MMOL/L — SIGNIFICANT CHANGE UP (ref 135–145)
TROPONIN I, HIGH SENSITIVITY RESULT: 11.9 NG/L — SIGNIFICANT CHANGE UP
TROPONIN I, HIGH SENSITIVITY RESULT: 16.8 NG/L — SIGNIFICANT CHANGE UP
WBC # BLD: 5.93 K/UL — SIGNIFICANT CHANGE UP (ref 3.8–10.5)
WBC # FLD AUTO: 5.93 K/UL — SIGNIFICANT CHANGE UP (ref 3.8–10.5)

## 2025-07-31 PROCEDURE — 20985 CPTR-ASST DIR MS PX: CPT

## 2025-07-31 PROCEDURE — 27447 TOTAL KNEE ARTHROPLASTY: CPT | Mod: AS,LT

## 2025-07-31 PROCEDURE — 27447 TOTAL KNEE ARTHROPLASTY: CPT | Mod: LT

## 2025-07-31 PROCEDURE — 88305 TISSUE EXAM BY PATHOLOGIST: CPT | Mod: 26

## 2025-07-31 PROCEDURE — 88311 DECALCIFY TISSUE: CPT | Mod: 26

## 2025-07-31 PROCEDURE — 73560 X-RAY EXAM OF KNEE 1 OR 2: CPT | Mod: 26,LT

## 2025-07-31 PROCEDURE — 93010 ELECTROCARDIOGRAM REPORT: CPT

## 2025-07-31 DEVICE — PATELLA TRIATHLON ASSYM SZ A3X10MM: Type: IMPLANTABLE DEVICE | Site: LEFT | Status: FUNCTIONAL

## 2025-07-31 DEVICE — COMP FEM CR CMNTLSS BEADED W/ PA SZ 4 LT: Type: IMPLANTABLE DEVICE | Site: LEFT | Status: FUNCTIONAL

## 2025-07-31 DEVICE — BASEPLATE TIB TRIATHLON TRITAN SZ 4: Type: IMPLANTABLE DEVICE | Site: LEFT | Status: FUNCTIONAL

## 2025-07-31 DEVICE — INSERT TIB BEARING CS X3 SZ 4 11MM: Type: IMPLANTABLE DEVICE | Site: LEFT | Status: FUNCTIONAL

## 2025-07-31 DEVICE — MAKO BONE PIN 3.2MM X 140MM: Type: IMPLANTABLE DEVICE | Site: LEFT | Status: FUNCTIONAL

## 2025-07-31 DEVICE — MAKO BONE PIN 3.2MM X 110MM: Type: IMPLANTABLE DEVICE | Site: LEFT | Status: FUNCTIONAL

## 2025-07-31 RX ORDER — ONDANSETRON HCL/PF 4 MG/2 ML
4 VIAL (ML) INJECTION ONCE
Refills: 0 | Status: DISCONTINUED | OUTPATIENT
Start: 2025-07-31 | End: 2025-07-31

## 2025-07-31 RX ORDER — VANCOMYCIN HCL IN 5 % DEXTROSE 1.5G/250ML
1250 PLASTIC BAG, INJECTION (ML) INTRAVENOUS ONCE
Refills: 0 | Status: COMPLETED | OUTPATIENT
Start: 2025-07-31 | End: 2025-07-31

## 2025-07-31 RX ORDER — SODIUM CHLORIDE 9 G/1000ML
1000 INJECTION, SOLUTION INTRAVENOUS
Refills: 0 | Status: DISCONTINUED | OUTPATIENT
Start: 2025-07-31 | End: 2025-07-31

## 2025-07-31 RX ORDER — ENALAPRIL MALEATE 20 MG
2 TABLET ORAL
Refills: 0 | DISCHARGE

## 2025-07-31 RX ORDER — ACETAMINOPHEN 500 MG/5ML
650 LIQUID (ML) ORAL ONCE
Refills: 0 | Status: COMPLETED | OUTPATIENT
Start: 2025-07-31 | End: 2025-07-31

## 2025-07-31 RX ORDER — CEFAZOLIN SODIUM IN 0.9 % NACL 3 G/100 ML
2000 INTRAVENOUS SOLUTION, PIGGYBACK (ML) INTRAVENOUS EVERY 8 HOURS
Refills: 0 | Status: COMPLETED | OUTPATIENT
Start: 2025-07-31 | End: 2025-08-01

## 2025-07-31 RX ORDER — MIRABEGRON 50 MG/1
1 TABLET, FILM COATED, EXTENDED RELEASE ORAL
Refills: 0 | DISCHARGE

## 2025-07-31 RX ORDER — LEVOTHYROXINE SODIUM 300 MCG
1 TABLET ORAL
Refills: 0 | DISCHARGE

## 2025-07-31 RX ORDER — FENTANYL CITRATE-0.9 % NACL/PF 100MCG/2ML
50 SYRINGE (ML) INTRAVENOUS ONCE
Refills: 0 | Status: DISCONTINUED | OUTPATIENT
Start: 2025-07-31 | End: 2025-07-31

## 2025-07-31 RX ORDER — ATORVASTATIN CALCIUM 80 MG/1
1 TABLET, FILM COATED ORAL
Refills: 0 | DISCHARGE

## 2025-07-31 RX ORDER — CELECOXIB 50 MG/1
200 CAPSULE ORAL ONCE
Refills: 0 | Status: COMPLETED | OUTPATIENT
Start: 2025-07-31 | End: 2025-07-31

## 2025-07-31 RX ORDER — MEMANTINE HYDROCHLORIDE 21 MG/1
1 CAPSULE, EXTENDED RELEASE ORAL
Refills: 0 | DISCHARGE

## 2025-07-31 RX ORDER — AMLODIPINE BESYLATE 10 MG/1
1 TABLET ORAL
Refills: 0 | DISCHARGE

## 2025-07-31 RX ORDER — ENALAPRIL MALEATE 20 MG
20 TABLET ORAL
Refills: 0 | Status: DISCONTINUED | OUTPATIENT
Start: 2025-07-31 | End: 2025-08-01

## 2025-07-31 RX ORDER — ACETAZOLAMIDE 250 MG/1
1 TABLET ORAL
Refills: 0 | DISCHARGE

## 2025-07-31 RX ORDER — ESCITALOPRAM OXALATE 20 MG/1
1 TABLET ORAL
Refills: 0 | DISCHARGE

## 2025-07-31 RX ADMIN — Medication 120 MILLILITER(S): at 17:21

## 2025-07-31 RX ADMIN — ESCITALOPRAM OXALATE 10 MILLIGRAM(S): 20 TABLET ORAL at 21:55

## 2025-07-31 RX ADMIN — Medication 20 MILLIGRAM(S): at 17:21

## 2025-07-31 RX ADMIN — Medication 3 MILLIGRAM(S): at 21:54

## 2025-07-31 RX ADMIN — OXYCODONE HYDROCHLORIDE 10 MILLIGRAM(S): 30 TABLET ORAL at 19:17

## 2025-07-31 RX ADMIN — ATORVASTATIN CALCIUM 40 MILLIGRAM(S): 80 TABLET, FILM COATED ORAL at 21:54

## 2025-07-31 RX ADMIN — Medication 650 MILLIGRAM(S): at 08:59

## 2025-07-31 RX ADMIN — OXYCODONE HYDROCHLORIDE 10 MILLIGRAM(S): 30 TABLET ORAL at 20:17

## 2025-07-31 RX ADMIN — KETOROLAC TROMETHAMINE 30 MILLIGRAM(S): 30 INJECTION, SOLUTION INTRAMUSCULAR; INTRAVENOUS at 21:44

## 2025-07-31 RX ADMIN — Medication 100 MILLIGRAM(S): at 19:14

## 2025-07-31 RX ADMIN — Medication 1000 MILLIGRAM(S): at 22:54

## 2025-07-31 RX ADMIN — Medication 166.67 MILLIGRAM(S): at 09:30

## 2025-07-31 RX ADMIN — KETOROLAC TROMETHAMINE 30 MILLIGRAM(S): 30 INJECTION, SOLUTION INTRAMUSCULAR; INTRAVENOUS at 20:44

## 2025-07-31 RX ADMIN — POLYETHYLENE GLYCOL 3350 17 GRAM(S): 17 POWDER, FOR SOLUTION ORAL at 21:55

## 2025-07-31 RX ADMIN — Medication 3 MILLILITER(S): at 21:55

## 2025-07-31 RX ADMIN — MEMANTINE HYDROCHLORIDE 10 MILLIGRAM(S): 21 CAPSULE, EXTENDED RELEASE ORAL at 17:21

## 2025-07-31 RX ADMIN — Medication 500 MILLILITER(S): at 12:08

## 2025-07-31 RX ADMIN — Medication 400 MILLIGRAM(S): at 21:54

## 2025-07-31 RX ADMIN — Medication 2 TABLET(S): at 21:55

## 2025-07-31 RX ADMIN — Medication 4 MILLIGRAM(S): at 19:16

## 2025-07-31 RX ADMIN — Medication 166.67 MILLIGRAM(S): at 21:55

## 2025-07-31 RX ADMIN — CELECOXIB 200 MILLIGRAM(S): 50 CAPSULE ORAL at 08:59

## 2025-08-01 ENCOUNTER — TRANSCRIPTION ENCOUNTER (OUTPATIENT)
Age: 80
End: 2025-08-01

## 2025-08-01 VITALS
SYSTOLIC BLOOD PRESSURE: 160 MMHG | DIASTOLIC BLOOD PRESSURE: 69 MMHG | OXYGEN SATURATION: 94 % | TEMPERATURE: 98 F | RESPIRATION RATE: 18 BRPM | HEART RATE: 64 BPM

## 2025-08-01 LAB
ANION GAP SERPL CALC-SCNC: 6 MMOL/L — SIGNIFICANT CHANGE UP (ref 5–17)
BUN SERPL-MCNC: 21 MG/DL — SIGNIFICANT CHANGE UP (ref 7–23)
CALCIUM SERPL-MCNC: 8 MG/DL — LOW (ref 8.5–10.1)
CHLORIDE SERPL-SCNC: 104 MMOL/L — SIGNIFICANT CHANGE UP (ref 96–108)
CO2 SERPL-SCNC: 25 MMOL/L — SIGNIFICANT CHANGE UP (ref 22–31)
CREAT SERPL-MCNC: 0.72 MG/DL — SIGNIFICANT CHANGE UP (ref 0.5–1.3)
EGFR: 84 ML/MIN/1.73M2 — SIGNIFICANT CHANGE UP
EGFR: 84 ML/MIN/1.73M2 — SIGNIFICANT CHANGE UP
GLUCOSE SERPL-MCNC: 130 MG/DL — HIGH (ref 70–99)
HCT VFR BLD CALC: 33.6 % — LOW (ref 34.5–45)
HGB BLD-MCNC: 11 G/DL — LOW (ref 11.5–15.5)
MCHC RBC-ENTMCNC: 32.6 PG — SIGNIFICANT CHANGE UP (ref 27–34)
MCHC RBC-ENTMCNC: 32.7 G/DL — SIGNIFICANT CHANGE UP (ref 32–36)
MCV RBC AUTO: 99.7 FL — SIGNIFICANT CHANGE UP (ref 80–100)
NRBC BLD AUTO-RTO: 0 /100 WBCS — SIGNIFICANT CHANGE UP (ref 0–0)
PLATELET # BLD AUTO: 200 K/UL — SIGNIFICANT CHANGE UP (ref 150–400)
POTASSIUM SERPL-MCNC: 4.4 MMOL/L — SIGNIFICANT CHANGE UP (ref 3.5–5.3)
POTASSIUM SERPL-SCNC: 4.4 MMOL/L — SIGNIFICANT CHANGE UP (ref 3.5–5.3)
RBC # BLD: 3.37 M/UL — LOW (ref 3.8–5.2)
RBC # FLD: 12.5 % — SIGNIFICANT CHANGE UP (ref 10.3–14.5)
SODIUM SERPL-SCNC: 135 MMOL/L — SIGNIFICANT CHANGE UP (ref 135–145)
TROPONIN I, HIGH SENSITIVITY RESULT: 9 NG/L — SIGNIFICANT CHANGE UP
WBC # BLD: 12 K/UL — HIGH (ref 3.8–10.5)
WBC # FLD AUTO: 12 K/UL — HIGH (ref 3.8–10.5)

## 2025-08-01 RX ORDER — TRAMADOL HYDROCHLORIDE 50 MG/1
1 TABLET, FILM COATED ORAL
Qty: 42 | Refills: 0
Start: 2025-08-01 | End: 2025-08-07

## 2025-08-01 RX ORDER — SENNA 187 MG
2 TABLET ORAL
Qty: 0 | Refills: 0 | DISCHARGE
Start: 2025-08-01

## 2025-08-01 RX ORDER — ASPIRIN 325 MG
1 TABLET ORAL
Refills: 0 | DISCHARGE

## 2025-08-01 RX ORDER — NALOXONE HYDROCHLORIDE 0.4 MG/ML
4 INJECTION, SOLUTION INTRAMUSCULAR; INTRAVENOUS; SUBCUTANEOUS
Qty: 1 | Refills: 0
Start: 2025-08-01 | End: 2025-08-01

## 2025-08-01 RX ORDER — TRAMADOL HYDROCHLORIDE 50 MG/1
25 TABLET, FILM COATED ORAL EVERY 4 HOURS
Refills: 0 | Status: DISCONTINUED | OUTPATIENT
Start: 2025-08-01 | End: 2025-08-01

## 2025-08-01 RX ORDER — POLYETHYLENE GLYCOL 3350 17 G/17G
17 POWDER, FOR SOLUTION ORAL
Qty: 0 | Refills: 0 | DISCHARGE
Start: 2025-08-01

## 2025-08-01 RX ORDER — CELECOXIB 50 MG/1
1 CAPSULE ORAL
Qty: 60 | Refills: 0
Start: 2025-08-01 | End: 2025-08-30

## 2025-08-01 RX ORDER — TRAMADOL HYDROCHLORIDE 50 MG/1
50 TABLET, FILM COATED ORAL EVERY 4 HOURS
Refills: 0 | Status: DISCONTINUED | OUTPATIENT
Start: 2025-08-01 | End: 2025-08-01

## 2025-08-01 RX ORDER — ASPIRIN 325 MG
1 TABLET ORAL
Qty: 60 | Refills: 0
Start: 2025-08-01 | End: 2025-08-30

## 2025-08-01 RX ORDER — MAGNESIUM, ALUMINUM HYDROXIDE 200-200 MG
30 TABLET,CHEWABLE ORAL EVERY 4 HOURS
Refills: 0 | Status: DISCONTINUED | OUTPATIENT
Start: 2025-08-01 | End: 2025-08-01

## 2025-08-01 RX ORDER — ACETAMINOPHEN 500 MG/5ML
2 LIQUID (ML) ORAL
Qty: 0 | Refills: 0 | DISCHARGE
Start: 2025-08-01

## 2025-08-01 RX ORDER — ONDANSETRON HCL/PF 4 MG/2 ML
1 VIAL (ML) INJECTION
Qty: 20 | Refills: 0
Start: 2025-08-01 | End: 2025-08-05

## 2025-08-01 RX ADMIN — KETOROLAC TROMETHAMINE 30 MILLIGRAM(S): 30 INJECTION, SOLUTION INTRAMUSCULAR; INTRAVENOUS at 12:18

## 2025-08-01 RX ADMIN — Medication 40 MILLIGRAM(S): at 06:12

## 2025-08-01 RX ADMIN — ESCITALOPRAM OXALATE 10 MILLIGRAM(S): 20 TABLET ORAL at 12:17

## 2025-08-01 RX ADMIN — MEMANTINE HYDROCHLORIDE 10 MILLIGRAM(S): 21 CAPSULE, EXTENDED RELEASE ORAL at 06:11

## 2025-08-01 RX ADMIN — Medication 20 MILLIGRAM(S): at 06:10

## 2025-08-01 RX ADMIN — KETOROLAC TROMETHAMINE 30 MILLIGRAM(S): 30 INJECTION, SOLUTION INTRAMUSCULAR; INTRAVENOUS at 05:03

## 2025-08-01 RX ADMIN — Medication 1000 MILLIGRAM(S): at 06:11

## 2025-08-01 RX ADMIN — AMLODIPINE BESYLATE 5 MILLIGRAM(S): 10 TABLET ORAL at 06:12

## 2025-08-01 RX ADMIN — Medication 88 MICROGRAM(S): at 05:02

## 2025-08-01 RX ADMIN — DEXAMETHASONE 102 MILLIGRAM(S): 0.5 TABLET ORAL at 05:02

## 2025-08-01 RX ADMIN — Medication 30 MILLILITER(S): at 09:19

## 2025-08-01 RX ADMIN — Medication 81 MILLIGRAM(S): at 06:12

## 2025-08-01 RX ADMIN — Medication 3 MILLILITER(S): at 13:50

## 2025-08-01 RX ADMIN — CELECOXIB 200 MILLIGRAM(S): 50 CAPSULE ORAL at 06:11

## 2025-08-01 RX ADMIN — KETOROLAC TROMETHAMINE 30 MILLIGRAM(S): 30 INJECTION, SOLUTION INTRAMUSCULAR; INTRAVENOUS at 13:18

## 2025-08-01 RX ADMIN — Medication 3 MILLILITER(S): at 06:11

## 2025-08-01 RX ADMIN — Medication 1 TABLET(S): at 12:18

## 2025-08-01 RX ADMIN — Medication 100 MILLIGRAM(S): at 03:46

## 2025-08-04 LAB — SURGICAL PATHOLOGY STUDY: SIGNIFICANT CHANGE UP

## 2025-08-05 DIAGNOSIS — Z79.82 LONG TERM (CURRENT) USE OF ASPIRIN: ICD-10-CM

## 2025-08-05 DIAGNOSIS — I25.10 ATHEROSCLEROTIC HEART DISEASE OF NATIVE CORONARY ARTERY WITHOUT ANGINA PECTORIS: ICD-10-CM

## 2025-08-05 DIAGNOSIS — Z86.73 PERSONAL HISTORY OF TRANSIENT ISCHEMIC ATTACK (TIA), AND CEREBRAL INFARCTION WITHOUT RESIDUAL DEFICITS: ICD-10-CM

## 2025-08-05 DIAGNOSIS — E78.00 PURE HYPERCHOLESTEROLEMIA, UNSPECIFIED: ICD-10-CM

## 2025-08-05 DIAGNOSIS — M17.12 UNILATERAL PRIMARY OSTEOARTHRITIS, LEFT KNEE: ICD-10-CM

## 2025-08-05 DIAGNOSIS — E03.9 HYPOTHYROIDISM, UNSPECIFIED: ICD-10-CM

## 2025-08-05 DIAGNOSIS — Z79.890 HORMONE REPLACEMENT THERAPY: ICD-10-CM

## 2025-08-05 DIAGNOSIS — Z87.891 PERSONAL HISTORY OF NICOTINE DEPENDENCE: ICD-10-CM

## 2025-08-05 DIAGNOSIS — I10 ESSENTIAL (PRIMARY) HYPERTENSION: ICD-10-CM

## 2025-08-05 DIAGNOSIS — Z88.5 ALLERGY STATUS TO NARCOTIC AGENT: ICD-10-CM

## 2025-08-11 ENCOUNTER — NON-APPOINTMENT (OUTPATIENT)
Age: 80
End: 2025-08-11

## 2025-08-15 ENCOUNTER — APPOINTMENT (OUTPATIENT)
Dept: ORTHOPEDIC SURGERY | Facility: CLINIC | Age: 80
End: 2025-08-15

## 2025-08-15 ENCOUNTER — APPOINTMENT (OUTPATIENT)
Dept: ORTHOPEDIC SURGERY | Facility: CLINIC | Age: 80
End: 2025-08-15
Payer: MEDICARE

## 2025-08-15 VITALS — BODY MASS INDEX: 33.49 KG/M2 | WEIGHT: 189 LBS | HEIGHT: 63 IN

## 2025-08-15 DIAGNOSIS — Z96.652 PRESENCE OF LEFT ARTIFICIAL KNEE JOINT: ICD-10-CM

## 2025-08-15 PROCEDURE — 99024 POSTOP FOLLOW-UP VISIT: CPT

## 2025-08-15 PROCEDURE — 73562 X-RAY EXAM OF KNEE 3: CPT | Mod: LT

## 2025-08-15 RX ORDER — TRAMADOL HYDROCHLORIDE 50 MG/1
50 TABLET, COATED ORAL
Qty: 21 | Refills: 0 | Status: ACTIVE | COMMUNITY
Start: 2025-08-15 | End: 1900-01-01

## 2025-09-08 ENCOUNTER — RX RENEWAL (OUTPATIENT)
Age: 80
End: 2025-09-08

## 2025-09-19 ENCOUNTER — APPOINTMENT (OUTPATIENT)
Dept: ORTHOPEDIC SURGERY | Facility: CLINIC | Age: 80
End: 2025-09-19

## (undated) DEVICE — SUCTION TIP KAMVAC MINI

## (undated) DEVICE — ZIMMER PULSAVAC PLUS FAN KIT

## (undated) DEVICE — MIXER BONE CEMENT EVAC III

## (undated) DEVICE — WARMING BLANKET UPPER ADULT

## (undated) DEVICE — TOURNIQUET ESMARK 6"

## (undated) DEVICE — SUT STRATAFIX SYMMETRIC PDS PLUS 1 18" CTX VIOLET

## (undated) DEVICE — POSITIONER FOAM BUMP FLAT TOP 10X6X4" LRG

## (undated) DEVICE — SUT STRATAFIX SPIRAL PDS PLUS 2-0 45CM CT-1

## (undated) DEVICE — DRAPE SURGICAL #1010

## (undated) DEVICE — CRYO/CUFF GRAVITY COOLER KNEE LARGE

## (undated) DEVICE — DRSG DERMABOND PRINEO 22CM

## (undated) DEVICE — NDL HYPO SAFE 22G X 1.5" (BLACK)

## (undated) DEVICE — DRSG ACE BANDAGE 6"

## (undated) DEVICE — SYR ASEPTO

## (undated) DEVICE — SUT STRATAFIX SPIRAL MONOCRYL PLUS 4-0 45CM PS-2 UNDYED

## (undated) DEVICE — DRSG TELFA 3 X 8

## (undated) DEVICE — MAKO BLADE NARROW

## (undated) DEVICE — PREP SCRUB BRUSH W CHG 4%

## (undated) DEVICE — MAKO BLADE STANDARD

## (undated) DEVICE — SAW BLADE STRYKER SAGITTAL 25X0.89X75MM

## (undated) DEVICE — SOL IRR BAG NS 0.9% 3000ML

## (undated) DEVICE — FRA-ESU BOVIE FORCE TRIAD T7J19717DX: Type: DURABLE MEDICAL EQUIPMENT

## (undated) DEVICE — HOOD T5 PEELAWAY

## (undated) DEVICE — HOOD FLYTE STRYKER HELMET SHIELD

## (undated) DEVICE — GLV 8.5 PROTEXIS ORTHO (BROWN)

## (undated) DEVICE — Device

## (undated) DEVICE — VENODYNE/SCD SLEEVE CALF MEDIUM

## (undated) DEVICE — DRSG COBAN 6"

## (undated) DEVICE — SUT VICRYL 1 36" CT-1 UNDYED

## (undated) DEVICE — SYR LUER LOK 20CC

## (undated) DEVICE — MEDICATION LABELS W MARKER

## (undated) DEVICE — MAKO VIZADISC KNEE TRACKING KIT

## (undated) DEVICE — MAKO CHECKPOINT KIT FEMORAL / TIBIAL

## (undated) DEVICE — GOWN IMPERV BREATHABLE XL

## (undated) DEVICE — PACK TOTAL KNEE

## (undated) DEVICE — ELCTR STRYKER NEPTUNE SMOKE EVACUATION PENCIL (GREEN)